# Patient Record
Sex: FEMALE | Race: WHITE | NOT HISPANIC OR LATINO | URBAN - METROPOLITAN AREA
[De-identification: names, ages, dates, MRNs, and addresses within clinical notes are randomized per-mention and may not be internally consistent; named-entity substitution may affect disease eponyms.]

---

## 2023-04-21 ENCOUNTER — OFFICE VISIT (OUTPATIENT)
Dept: PRIMARY CARE | Facility: CLINIC | Age: 23
End: 2023-04-21
Payer: COMMERCIAL

## 2023-04-21 ENCOUNTER — APPOINTMENT (OUTPATIENT)
Dept: LAB | Facility: LAB | Age: 23
End: 2023-04-21
Payer: COMMERCIAL

## 2023-04-21 VITALS
HEIGHT: 61 IN | BODY MASS INDEX: 44.56 KG/M2 | WEIGHT: 236 LBS | DIASTOLIC BLOOD PRESSURE: 68 MMHG | SYSTOLIC BLOOD PRESSURE: 112 MMHG

## 2023-04-21 DIAGNOSIS — G56.03 BILATERAL CARPAL TUNNEL SYNDROME: Primary | ICD-10-CM

## 2023-04-21 DIAGNOSIS — R59.1 LYMPHADENOPATHY: ICD-10-CM

## 2023-04-21 LAB — THYROTROPIN (MIU/L) IN SER/PLAS BY DETECTION LIMIT <= 0.05 MIU/L: 1.43 MIU/L (ref 0.44–3.98)

## 2023-04-21 PROCEDURE — 99213 OFFICE O/P EST LOW 20 MIN: CPT | Performed by: STUDENT IN AN ORGANIZED HEALTH CARE EDUCATION/TRAINING PROGRAM

## 2023-04-21 PROCEDURE — 36415 COLL VENOUS BLD VENIPUNCTURE: CPT

## 2023-04-21 PROCEDURE — 1036F TOBACCO NON-USER: CPT | Performed by: STUDENT IN AN ORGANIZED HEALTH CARE EDUCATION/TRAINING PROGRAM

## 2023-04-21 PROCEDURE — 84443 ASSAY THYROID STIM HORMONE: CPT

## 2023-04-21 RX ORDER — BETAMETHASONE DIPROPIONATE 0.5 MG/G
0.5 OINTMENT TOPICAL 2 TIMES DAILY
COMMUNITY
Start: 2022-05-20

## 2023-04-21 ASSESSMENT — ENCOUNTER SYMPTOMS
CARDIOVASCULAR NEGATIVE: 1
GASTROINTESTINAL NEGATIVE: 1
CONSTITUTIONAL NEGATIVE: 1
NEUROLOGICAL NEGATIVE: 1
PSYCHIATRIC NEGATIVE: 1
MUSCULOSKELETAL NEGATIVE: 1
RESPIRATORY NEGATIVE: 1

## 2023-04-21 NOTE — PROGRESS NOTES
"Subjective   Patient ID: Darshana Tapia is a 23 y.o. female who presents for Wrist Pain (Bilateral wrist discomfort, x 1 year, getting worse).    Patient seen on sick visit.  Regards that she has been having some numbness and tingling and some pain hands.  States that happens bilaterally, however left worse than right.  Gets worse as she works throughout the day.  Also has been having some swelling of her neck.  This comes and goes, feels like a lymph node is swollen.  Regards no additional issues or concerns at this time.         Review of Systems   Constitutional: Negative.    HENT: Negative.          Swelling of neck   Respiratory: Negative.     Cardiovascular: Negative.    Gastrointestinal: Negative.    Musculoskeletal: Negative.         Bilateral wrist pain   Neurological: Negative.    Psychiatric/Behavioral: Negative.         Objective   /68   Ht 1.549 m (5' 1\")   Wt 107 kg (236 lb)   BMI 44.59 kg/m²     Physical Exam  Constitutional:       General: She is not in acute distress.     Appearance: She is not ill-appearing.   HENT:      Head:        Comments: Slight lymphanopathy L sided under chin     Mouth/Throat:      Mouth: Mucous membranes are moist.      Pharynx: Oropharynx is clear. No oropharyngeal exudate or posterior oropharyngeal erythema.   Eyes:      Pupils: Pupils are equal, round, and reactive to light.   Cardiovascular:      Rate and Rhythm: Normal rate and regular rhythm.      Heart sounds: No murmur heard.  Pulmonary:      Effort: Pulmonary effort is normal. No respiratory distress.      Breath sounds: No wheezing.   Abdominal:      General: Abdomen is flat. Bowel sounds are normal. There is no distension.      Palpations: Abdomen is soft.      Tenderness: There is no abdominal tenderness.   Musculoskeletal:         General: No tenderness. Normal range of motion.      Comments: Phalen's pos bilaterall, dec sensation in first 3 fiungers   Skin:     General: Skin is warm and dry. "   Neurological:      General: No focal deficit present.      Mental Status: She is alert and oriented to person, place, and time. Mental status is at baseline.   Psychiatric:         Mood and Affect: Mood normal.         Assessment/Plan   Problem List Items Addressed This Visit          Nervous    Bilateral carpal tunnel syndrome - Primary    Relevant Orders    TSH with reflex to Free T4 if abnormal    EMG & nerve conduction       Immune    Lymphadenopathy    Relevant Orders    US head neck soft tissue        Patient seen on sick evaluation    Tunnel syndrome  Phalen's test positive bilaterally, recommend EMG, supportive braces, staying well-hydrated check TSH we will follow-up on EMG results could consider course of steroids as well as orthopedic referral if symptoms worsen      Lymphadenopathy  Noted on exam, slightly under left-sided chin, we will obtain ultrasound of the area, encouraged to monitor.  He will call if this worsens    Return to care and neck schedule appointment or as needed

## 2023-09-15 ENCOUNTER — OFFICE VISIT (OUTPATIENT)
Dept: PRIMARY CARE | Facility: CLINIC | Age: 23
End: 2023-09-15
Payer: COMMERCIAL

## 2023-09-15 VITALS
BODY MASS INDEX: 44.56 KG/M2 | DIASTOLIC BLOOD PRESSURE: 64 MMHG | SYSTOLIC BLOOD PRESSURE: 110 MMHG | WEIGHT: 236 LBS | HEIGHT: 61 IN

## 2023-09-15 DIAGNOSIS — J01.90 ACUTE RHINOSINUSITIS: Primary | ICD-10-CM

## 2023-09-15 PROBLEM — M25.373 UNSTABLE ANKLE: Status: RESOLVED | Noted: 2023-09-15 | Resolved: 2023-09-15

## 2023-09-15 PROBLEM — D18.01 HEMANGIOMA OF SKIN AND SUBCUTANEOUS TISSUE: Status: ACTIVE | Noted: 2023-09-15

## 2023-09-15 PROBLEM — H92.09 EAR PAIN: Status: RESOLVED | Noted: 2023-09-15 | Resolved: 2023-09-15

## 2023-09-15 PROBLEM — J40 BRONCHITIS: Status: RESOLVED | Noted: 2023-09-15 | Resolved: 2023-09-15

## 2023-09-15 PROBLEM — E28.39 SUPPRESSION OF OVULATION: Status: ACTIVE | Noted: 2023-09-15

## 2023-09-15 PROBLEM — M25.571 RIGHT ANKLE PAIN: Status: RESOLVED | Noted: 2023-09-15 | Resolved: 2023-09-15

## 2023-09-15 PROBLEM — E66.9 OBESITY: Status: RESOLVED | Noted: 2023-09-15 | Resolved: 2023-09-15

## 2023-09-15 PROBLEM — R21 RASH: Status: ACTIVE | Noted: 2023-09-15

## 2023-09-15 PROBLEM — D48.5 NEOPLASM OF UNCERTAIN BEHAVIOR OF SKIN: Status: ACTIVE | Noted: 2023-09-15

## 2023-09-15 PROBLEM — R10.9 ABDOMINAL PAIN: Status: RESOLVED | Noted: 2023-09-15 | Resolved: 2023-09-15

## 2023-09-15 PROBLEM — L30.9 ECZEMA: Status: ACTIVE | Noted: 2023-09-15

## 2023-09-15 PROBLEM — K59.00 CONSTIPATION: Status: ACTIVE | Noted: 2023-09-15

## 2023-09-15 PROCEDURE — 1036F TOBACCO NON-USER: CPT | Performed by: STUDENT IN AN ORGANIZED HEALTH CARE EDUCATION/TRAINING PROGRAM

## 2023-09-15 PROCEDURE — 99213 OFFICE O/P EST LOW 20 MIN: CPT | Performed by: STUDENT IN AN ORGANIZED HEALTH CARE EDUCATION/TRAINING PROGRAM

## 2023-09-15 RX ORDER — GUAIFENESIN, PSEUDOEPHEDRINE HYDROCHLORIDE 600; 60 MG/1; MG/1
1 TABLET, EXTENDED RELEASE ORAL EVERY 12 HOURS
Qty: 14 TABLET | Refills: 0 | Status: SHIPPED | OUTPATIENT
Start: 2023-09-15 | End: 2023-09-22

## 2023-09-15 RX ORDER — AMOXICILLIN AND CLAVULANATE POTASSIUM 875; 125 MG/1; MG/1
875 TABLET, FILM COATED ORAL 2 TIMES DAILY
Qty: 14 TABLET | Refills: 0 | Status: SHIPPED | OUTPATIENT
Start: 2023-09-15 | End: 2023-09-22

## 2023-09-15 RX ORDER — DROSPIRENONE 4 MG/1
1 TABLET, FILM COATED ORAL DAILY
COMMUNITY
Start: 2023-06-13

## 2023-09-15 RX ORDER — FLUTICASONE PROPIONATE 50 MCG
1 SPRAY, SUSPENSION (ML) NASAL DAILY
Qty: 16 G | Refills: 5 | Status: SHIPPED | OUTPATIENT
Start: 2023-09-15 | End: 2023-11-21 | Stop reason: WASHOUT

## 2023-09-15 NOTE — PROGRESS NOTES
"Subjective   Patient ID: Darshana Tapia is a 23 y.o. female who presents for Sinusitis (Head congestion/pressure, H/A,x  5 days).    HPI   Darshana is here for a sick visit.    She reports rhinorrhea, nasal congestion, pressure-like headache, post-nasal drainage, coughing up clear phlegm, facial pressure, bilateral ear pain and pressure. Endorses sore throat, chills, body aches which have resolved. Denies fevers, fatigue, wheezing, SOB. She presented to urgent care and was tested for COVID, flu, strep - all of which were negative. They did not give her medication at that time.     She was taking theraflu at home which was not helping her symptoms. She has taken ibuprofen for the headaches.     Review of Systems  A 10 point review of systems is otherwise negative unless stated in the HPI.    Objective   /64   Ht 1.549 m (5' 1\")   Wt 107 kg (236 lb)   BMI 44.59 kg/m²     Physical Exam  GEN: conversant, NAD  HEENT: PERRL, EOMI, right TM with fluid without erythema or signs of OM. Some tenderness to palpation L>R maxillary sinus   NECK: supple, full, no cervical lymphadenopathy palpated  CV: S1, S2, regular, no murmur  PULM: CTAB  ABD: soft, NT, obese  EXT: no LE edema  NEURO: no gross focal deficits  PSYCH: appropriate affect       Assessment/Plan   #acute rhinosinusitis, likely viral  #nasal congestion  - given script for flonase, mucinex D  - time course and constellation of symptoms consistent with acute viral rhinosinusitis. Patient with 5 days of symptoms. Antibiotic sent to pharmacy if patient develops fevers over the weekend or experiences worsening of symptoms. Discuss SE profile.    RTC as scheduled, sooner PRN    Med Khan MD       "

## 2023-11-21 ENCOUNTER — OFFICE VISIT (OUTPATIENT)
Dept: PRIMARY CARE | Facility: CLINIC | Age: 23
End: 2023-11-21
Payer: COMMERCIAL

## 2023-11-21 VITALS
WEIGHT: 242 LBS | SYSTOLIC BLOOD PRESSURE: 130 MMHG | BODY MASS INDEX: 45.69 KG/M2 | DIASTOLIC BLOOD PRESSURE: 72 MMHG | HEIGHT: 61 IN

## 2023-11-21 DIAGNOSIS — E66.01 MORBID OBESITY (MULTI): Primary | ICD-10-CM

## 2023-11-21 DIAGNOSIS — R59.1 LYMPHADENOPATHY: ICD-10-CM

## 2023-11-21 PROCEDURE — 96127 BRIEF EMOTIONAL/BEHAV ASSMT: CPT | Performed by: STUDENT IN AN ORGANIZED HEALTH CARE EDUCATION/TRAINING PROGRAM

## 2023-11-21 PROCEDURE — 99395 PREV VISIT EST AGE 18-39: CPT | Performed by: STUDENT IN AN ORGANIZED HEALTH CARE EDUCATION/TRAINING PROGRAM

## 2023-11-21 PROCEDURE — 1036F TOBACCO NON-USER: CPT | Performed by: STUDENT IN AN ORGANIZED HEALTH CARE EDUCATION/TRAINING PROGRAM

## 2023-11-21 ASSESSMENT — ENCOUNTER SYMPTOMS
NAUSEA: 1
ABDOMINAL DISTENTION: 1
DIARRHEA: 1
SEIZURES: 0
JOINT SWELLING: 0
PALPITATIONS: 0
CHILLS: 0
VOMITING: 0
COUGH: 0
UNEXPECTED WEIGHT CHANGE: 1
HEMATURIA: 0
SLEEP DISTURBANCE: 0
FEVER: 0
DYSURIA: 0
EYE PAIN: 0
SHORTNESS OF BREATH: 0
ABDOMINAL PAIN: 1

## 2023-11-21 NOTE — PROGRESS NOTES
"Subjective   Patient ID: Darshana Tapia is a 23 y.o. female who presents for Obesity (Discuss weight loss).    Patient presents for difficulty losing weight. She has gained over 100 lbs since 2020. Over the past 6 months, she has been eating well and exercising 3 days per week, in addition to an active job. This regimen has led to weight loss in the past, but has not been successful for her now. Her weight seems to fluctuate during the past 6 months. She reports using the treadmill for about 15-30 mins and weights. She eats chicken, rice, vegetables, oatmeal, eggs. She has not tried any other methods for weight. Patient also has chronic GI issues since she was a kid, including bloating, waking up with nausea, occasional abdominal pain and usually has diarrhea.      Review of Systems   Constitutional:  Positive for unexpected weight change. Negative for chills and fever.   HENT:  Negative for ear pain.    Eyes:  Negative for pain.   Respiratory:  Negative for cough and shortness of breath.    Cardiovascular:  Negative for chest pain and palpitations.   Gastrointestinal:  Positive for abdominal distention, abdominal pain, diarrhea and nausea. Negative for vomiting.   Genitourinary:  Negative for dysuria and hematuria.   Musculoskeletal:  Negative for joint swelling.   Skin:  Negative for rash.   Neurological:  Negative for seizures and syncope.   Psychiatric/Behavioral:  Negative for sleep disturbance.    All other systems reviewed and are negative.    Objective   /72   Ht 1.549 m (5' 1\")   Wt 110 kg (242 lb)   BMI 45.73 kg/m²     Physical Exam  Vitals and nursing note reviewed.   Constitutional:       General: She is not in acute distress.     Appearance: She is obese. She is not ill-appearing or toxic-appearing.   HENT:      Head: Normocephalic and atraumatic.      Right Ear: External ear normal.      Left Ear: External ear normal.      Mouth/Throat:      Mouth: Mucous membranes are moist.   Eyes:      " Conjunctiva/sclera: Conjunctivae normal.   Cardiovascular:      Rate and Rhythm: Normal rate and regular rhythm.      Heart sounds: Normal heart sounds.   Pulmonary:      Effort: Pulmonary effort is normal.      Breath sounds: Normal breath sounds.   Abdominal:      General: There is no distension.      Palpations: Abdomen is soft.      Tenderness: There is no abdominal tenderness. There is no guarding.   Musculoskeletal:      Cervical back: Neck supple.      Right lower leg: No edema.      Left lower leg: No edema.   Skin:     General: Skin is warm and dry.   Neurological:      General: No focal deficit present.      Mental Status: She is alert and oriented to person, place, and time.   Psychiatric:         Behavior: Behavior normal.         Assessment/Plan   Problem List Items Addressed This Visit             ICD-10-CM    Lymphadenopathy R59.1    Morbid obesity (CMS/[facility]) - Primary E66.01    Relevant Medications    naltrexone-bupropion (Contrave) 8-90 mg ER tablet    Other Relevant Orders    Referral to Nutrition Services     #Morbid obesity  -Gained ~100 lbs since 2020. Discussed medication options, potential nutritionist referral.   -Continue exercise and diet modifications   -discussed se profile of multiple medications, agreeable to contrave due to se profile  -will call if unable to get insurance coverage,>15 minutes spent in management of obesity    #Abdominal discomfort/bloating  #IBS, possible  -Celiac negative. Discussed FODMAPs diet.      #Health maintenance  -Declines COVID and flu vaccines     RTC in 3 months or prn

## 2023-11-21 NOTE — PROGRESS NOTES
"Subjective   Patient ID: Darshana Tapia is a 23 y.o. female who presents for Obesity (Discuss weight loss).    Patient presents for difficulty losing weight. She has gained over 100 lbs since 2020. Over the past 6 months, she has been eating well and exercising 3 days per week, in addition to an active job. This regimen has led to weight loss in the past, but has not been successful for her now. Her weight seems to fluctuate during the past 6 months. She reports using the treadmill for about 15-30 mins and weights. She eats chicken, rice, vegetables, oatmeal, eggs. She has not tried any other methods for weight. Patient also has chronic GI issues since she was a kid, including bloating, waking up with nausea, occasional abdominal pain and usually has diarrhea.     Review of Systems   Constitutional:  Positive for unexpected weight change. Negative for chills and fever.   HENT:  Negative for ear pain.    Eyes:  Negative for pain.   Respiratory:  Negative for cough and shortness of breath.    Cardiovascular:  Negative for chest pain and palpitations.   Gastrointestinal:  Positive for abdominal distention, abdominal pain, diarrhea and nausea. Negative for vomiting.   Genitourinary:  Negative for dysuria and hematuria.   Musculoskeletal:  Negative for joint swelling.   Skin:  Negative for rash.   Neurological:  Negative for seizures and syncope.   Psychiatric/Behavioral:  Negative for sleep disturbance.    All other systems reviewed and are negative.      Objective   /72   Ht 1.549 m (5' 1\")   Wt 110 kg (242 lb)   BMI 45.73 kg/m²     Physical Exam  Vitals and nursing note reviewed.   Constitutional:       General: She is not in acute distress.     Appearance: She is obese. She is not ill-appearing or toxic-appearing.   HENT:      Head: Normocephalic and atraumatic.      Right Ear: External ear normal.      Left Ear: External ear normal.      Mouth/Throat:      Mouth: Mucous membranes are moist.   Eyes:     "  Conjunctiva/sclera: Conjunctivae normal.   Cardiovascular:      Rate and Rhythm: Normal rate and regular rhythm.      Heart sounds: Normal heart sounds.   Pulmonary:      Effort: Pulmonary effort is normal.      Breath sounds: Normal breath sounds.   Abdominal:      General: There is no distension.      Palpations: Abdomen is soft.      Tenderness: There is no abdominal tenderness. There is no guarding.   Musculoskeletal:      Cervical back: Neck supple.      Right lower leg: No edema.      Left lower leg: No edema.   Skin:     General: Skin is warm and dry.   Neurological:      General: No focal deficit present.      Mental Status: She is alert and oriented to person, place, and time.   Psychiatric:         Behavior: Behavior normal.         Assessment/Plan   #Morbid obesity  -Gained ~100 lbs since 2020. Discussed medication options, potential nutritionist referral.   -Continue exercise and diet modifications    #Abdominal discomfort/bloating  #IBS, possible  -Celiac negative. Discussed FODMAPs diet.     #Health maintenance  -Declines COVID and flu vaccines    RTC in 1 year or earlier PRN

## 2024-10-17 ENCOUNTER — HOSPITAL ENCOUNTER (EMERGENCY)
Facility: HOSPITAL | Age: 24
Discharge: HOME | End: 2024-10-17
Attending: EMERGENCY MEDICINE

## 2024-10-17 ENCOUNTER — APPOINTMENT (OUTPATIENT)
Dept: CARDIOLOGY | Facility: HOSPITAL | Age: 24
End: 2024-10-17

## 2024-10-17 VITALS
BODY MASS INDEX: 46.82 KG/M2 | HEIGHT: 61 IN | HEART RATE: 98 BPM | RESPIRATION RATE: 18 BRPM | TEMPERATURE: 98.1 F | OXYGEN SATURATION: 98 % | SYSTOLIC BLOOD PRESSURE: 129 MMHG | DIASTOLIC BLOOD PRESSURE: 81 MMHG | WEIGHT: 248 LBS

## 2024-10-17 DIAGNOSIS — R19.7 NAUSEA, VOMITING AND DIARRHEA: Primary | ICD-10-CM

## 2024-10-17 DIAGNOSIS — R11.2 NAUSEA, VOMITING AND DIARRHEA: Primary | ICD-10-CM

## 2024-10-17 LAB
ALBUMIN SERPL BCP-MCNC: 4.6 G/DL (ref 3.4–5)
ALP SERPL-CCNC: 70 U/L (ref 33–110)
ALT SERPL W P-5'-P-CCNC: 16 U/L (ref 7–45)
ANION GAP SERPL CALC-SCNC: 15 MMOL/L (ref 10–20)
AST SERPL W P-5'-P-CCNC: 25 U/L (ref 9–39)
B-HCG SERPL-ACNC: <2 MIU/ML
BASOPHILS # BLD AUTO: 0.02 X10*3/UL (ref 0–0.1)
BASOPHILS NFR BLD AUTO: 0.3 %
BILIRUB SERPL-MCNC: 0.3 MG/DL (ref 0–1.2)
BUN SERPL-MCNC: 7 MG/DL (ref 6–23)
CALCIUM SERPL-MCNC: 9.4 MG/DL (ref 8.6–10.3)
CHLORIDE SERPL-SCNC: 107 MMOL/L (ref 98–107)
CO2 SERPL-SCNC: 22 MMOL/L (ref 21–32)
CREAT SERPL-MCNC: 0.68 MG/DL (ref 0.5–1.05)
EGFRCR SERPLBLD CKD-EPI 2021: >90 ML/MIN/1.73M*2
EOSINOPHIL # BLD AUTO: 0.01 X10*3/UL (ref 0–0.7)
EOSINOPHIL NFR BLD AUTO: 0.1 %
ERYTHROCYTE [DISTWIDTH] IN BLOOD BY AUTOMATED COUNT: 12.6 % (ref 11.5–14.5)
FLUAV RNA RESP QL NAA+PROBE: NOT DETECTED
FLUBV RNA RESP QL NAA+PROBE: NOT DETECTED
GLUCOSE SERPL-MCNC: 97 MG/DL (ref 74–99)
HCT VFR BLD AUTO: 42.7 % (ref 36–46)
HGB BLD-MCNC: 14.2 G/DL (ref 12–16)
IMM GRANULOCYTES # BLD AUTO: 0.02 X10*3/UL (ref 0–0.7)
IMM GRANULOCYTES NFR BLD AUTO: 0.3 % (ref 0–0.9)
LIPASE SERPL-CCNC: 15 U/L (ref 9–82)
LYMPHOCYTES # BLD AUTO: 1.2 X10*3/UL (ref 1.2–4.8)
LYMPHOCYTES NFR BLD AUTO: 15.7 %
MAGNESIUM SERPL-MCNC: 2.1 MG/DL (ref 1.6–2.4)
MCH RBC QN AUTO: 28.9 PG (ref 26–34)
MCHC RBC AUTO-ENTMCNC: 33.3 G/DL (ref 32–36)
MCV RBC AUTO: 87 FL (ref 80–100)
MONOCYTES # BLD AUTO: 0.37 X10*3/UL (ref 0.1–1)
MONOCYTES NFR BLD AUTO: 4.8 %
NEUTROPHILS # BLD AUTO: 6.01 X10*3/UL (ref 1.2–7.7)
NEUTROPHILS NFR BLD AUTO: 78.8 %
NRBC BLD-RTO: 0 /100 WBCS (ref 0–0)
PLATELET # BLD AUTO: 288 X10*3/UL (ref 150–450)
POTASSIUM SERPL-SCNC: 5 MMOL/L (ref 3.5–5.3)
PROT SERPL-MCNC: 7.9 G/DL (ref 6.4–8.2)
RBC # BLD AUTO: 4.92 X10*6/UL (ref 4–5.2)
SARS-COV-2 RNA RESP QL NAA+PROBE: NOT DETECTED
SODIUM SERPL-SCNC: 139 MMOL/L (ref 136–145)
WBC # BLD AUTO: 7.6 X10*3/UL (ref 4.4–11.3)

## 2024-10-17 PROCEDURE — 36415 COLL VENOUS BLD VENIPUNCTURE: CPT | Performed by: EMERGENCY MEDICINE

## 2024-10-17 PROCEDURE — 2500000005 HC RX 250 GENERAL PHARMACY W/O HCPCS: Performed by: EMERGENCY MEDICINE

## 2024-10-17 PROCEDURE — 84075 ASSAY ALKALINE PHOSPHATASE: CPT | Performed by: EMERGENCY MEDICINE

## 2024-10-17 PROCEDURE — 93005 ELECTROCARDIOGRAM TRACING: CPT

## 2024-10-17 PROCEDURE — 99283 EMERGENCY DEPT VISIT LOW MDM: CPT

## 2024-10-17 PROCEDURE — 85025 COMPLETE CBC W/AUTO DIFF WBC: CPT | Performed by: EMERGENCY MEDICINE

## 2024-10-17 PROCEDURE — 84702 CHORIONIC GONADOTROPIN TEST: CPT | Performed by: EMERGENCY MEDICINE

## 2024-10-17 PROCEDURE — 83735 ASSAY OF MAGNESIUM: CPT | Performed by: EMERGENCY MEDICINE

## 2024-10-17 PROCEDURE — 87636 SARSCOV2 & INF A&B AMP PRB: CPT | Performed by: EMERGENCY MEDICINE

## 2024-10-17 PROCEDURE — 83690 ASSAY OF LIPASE: CPT | Performed by: EMERGENCY MEDICINE

## 2024-10-17 RX ORDER — ONDANSETRON 4 MG/1
4 TABLET, ORALLY DISINTEGRATING ORAL EVERY 8 HOURS PRN
Qty: 5 TABLET | Refills: 0 | Status: SHIPPED | OUTPATIENT
Start: 2024-10-17

## 2024-10-17 RX ORDER — ONDANSETRON 4 MG/1
4 TABLET, ORALLY DISINTEGRATING ORAL ONCE
Status: COMPLETED | OUTPATIENT
Start: 2024-10-17 | End: 2024-10-17

## 2024-10-17 RX ADMIN — ONDANSETRON 4 MG: 4 TABLET, ORALLY DISINTEGRATING ORAL at 09:47

## 2024-10-17 ASSESSMENT — PAIN SCALES - GENERAL: PAINLEVEL_OUTOF10: 0 - NO PAIN

## 2024-10-17 ASSESSMENT — PAIN - FUNCTIONAL ASSESSMENT: PAIN_FUNCTIONAL_ASSESSMENT: 0-10

## 2024-10-17 ASSESSMENT — COLUMBIA-SUICIDE SEVERITY RATING SCALE - C-SSRS
6. HAVE YOU EVER DONE ANYTHING, STARTED TO DO ANYTHING, OR PREPARED TO DO ANYTHING TO END YOUR LIFE?: NO
1. IN THE PAST MONTH, HAVE YOU WISHED YOU WERE DEAD OR WISHED YOU COULD GO TO SLEEP AND NOT WAKE UP?: NO
2. HAVE YOU ACTUALLY HAD ANY THOUGHTS OF KILLING YOURSELF?: NO

## 2024-10-17 NOTE — Clinical Note
Darshana Tapia was seen and treated in our emergency department on 10/17/2024.  She may return to work on 10/18/2024.       If you have any questions or concerns, please don't hesitate to call.      Vish Cai MD

## 2024-10-17 NOTE — DISCHARGE INSTRUCTIONS
You were seen emergent today for evaluation of nausea vomiting diarrhea.  Your labs are reassuring.  Your exam is reassuring.  Please continue as needed Zofran for nausea and vomiting as prescribed.  Please return to the emergency room if you have worsening symptoms including but not limited to recurrent nausea vomiting, pain, distention, loss of bowel function, fever, or if you have any other concerns.  Please follow-up with your primary care provider regarding this emergency room visit.

## 2024-10-17 NOTE — Clinical Note
Darshana Tapia was seen and treated in our emergency department on 10/17/2024.  She may return to school on 10/18/2024.      If you have any questions or concerns, please don't hesitate to call.      Vish Cai MD

## 2024-10-17 NOTE — ED TRIAGE NOTES
Pt recently started a GLP-1 medication and is experiencing severe nausea and vomiting. Pt had taken prescribed zofran without relief,

## 2024-10-17 NOTE — ED PROVIDER NOTES
History/Exam limitations: none.   Additional history was obtained from patient and parent.          HPI:    Darshana Tapia is a 24 y.o. female PMH elevated BMI on GLP-1 inhibitor presenting for evaluation nausea vomiting diarrhea.  Patient states that she woke up early this morning began having vomiting nonbloody.  Has had multiple episodes.  Last episode over an hour ago.  Also had 3 episodes of nonbloody diarrhea.  States that she has had somewhat of an upset stomach but no pain currently.  No right upper quadrant pain.  No distention.  States that she ate [employer] last night and is concerned that may be related to her symptoms.  No headache vision changes, chest pain, shortness breath, fever, fatigue.  States that she had non-ODT oral Zofran at home and vomited the medication up when she attempted to take it.  No acute onset lower abdominal pain.  Denies chance of pregnancy.         Physical Exam:  ED Triage Vitals [10/17/24 0851]   Temperature Heart Rate Respirations BP   36.7 °C (98.1 °F) 97 18 125/81      Pulse Ox Temp Source Heart Rate Source Patient Position   98 % Oral -- --      BP Location FiO2 (%)     -- --        GEN:      Alert, NAD  Eyes:       PERRL, EOMI  HENT:      NC/AT, OP clear, airway patent, MM  CV:      RRR, no MRG, no LE pitting edema, 2+ radial and pedal pulses  PULM:     CTAB, no w/r/r, easy WOB, symmetric chest rise  ABD:      Soft, NT, ND, no masses, BS +  :       No CVA TTP  NEURO:   A&Ox3, no focal deficits    MSK:      FROM, no joint deformities or swelling, no e/o trauma  SKIN:       Warm and dry  PSYCH:    Appropriate mood and affect         MDM/ED Course:   Darshana Tapia is a 24 y.o. female PMH elevated BMI on GLP-1 inhibitor presenting for evaluation nausea vomiting diarrhea.  Vitals reassuring.  Exam as documented above.  Differential includes viral gastroenteritis, foodborne illness.  No recent travel antibiotic use or other red flag symptoms.  Significantly  invasive gastrointestinal bacterial illness.  Differential includes acute abdominal surgical pathology however less likely given lack of any tenderness or pain.  Differential includes bowel obstruction however less likely given no distention, no pain, bowel function present.  Considered side effect of GLP-1 inhibitor however reassuring the patient is having diarrhea as well.  Patient has Zofran at home however was not ODT, was unable to keep medication down.  Labs were drawn however IV reportedly blew and the patient requested deferring further IV attempt and ODT Zofran was given here in the ED.  Labs were obtained CBC reassuring.  Chemistry reassuring with reassuring liver enzymes.  Potassium mildly hemolyzed at 5.0 however creatinine reassuring at 0.68.  Lipase negative unlikely pancreatitis.  hCG negative.  COVID flu negative.  On reassessment patient tolerating p.o. and symptoms resolved.  EKG reassuring, low suspicion for atypical ACS given age and risk factors.  Patient was explained findings, exam/study results, the importance of follow up and the plan moving forward; patient verbalized understanding and agreement. All questions were answered and no new questions at this time. Patient will be discharged with strict return precautions, follow up plan with PCP.    EKG reviewed by me: 9:06 AM normal sinus rhythm heart rate 98, sinus arrhythmia, normal axis, normal intervals, poor baseline however no acute ischemic changes, no prior for comparison.     ED Course as of 10/18/24 1251   Thu Oct 17, 2024   1127 Patient feels improved, tolerating p.o.  No abdominal pain or distention.  Suspicion for urinary etiology, will defer urine his urine test not yet collected. [JM]      ED Course User Index  [JM] Vish Cai MD         Diagnoses as of 10/18/24 1251   Nausea, vomiting and diarrhea         Chronic medical conditions affecting care listed in MDM. I independently reviewed imaging studies and agreed with  radiology reads. I reviewed recent and relevant outside records including PCP notes, Prior discharge summaries, and prior radiology reports.    Procedure  Procedures    Diagnosis:   1.  Nausea, vomiting, and diarrhea    Dispo: Discharged in stable condition      Disclaimer: Portions of this note were dictated by speech recognition. An attempt at proof reading was made to minimize errors. Minor errors in transcription may be present.  Please call if questions.     Vish Cai MD  10/18/24 2492

## 2024-10-19 LAB
ATRIAL RATE: 98 BPM
P AXIS: 26 DEGREES
P OFFSET: 197 MS
P ONSET: 151 MS
PR INTERVAL: 132 MS
Q ONSET: 217 MS
QRS COUNT: 16 BEATS
QRS DURATION: 78 MS
QT INTERVAL: 370 MS
QTC CALCULATION(BAZETT): 472 MS
QTC FREDERICIA: 435 MS
R AXIS: 26 DEGREES
T AXIS: 24 DEGREES
T OFFSET: 402 MS
VENTRICULAR RATE: 98 BPM

## 2025-01-03 ENCOUNTER — HOSPITAL ENCOUNTER (EMERGENCY)
Facility: HOSPITAL | Age: 25
Discharge: HOME | End: 2025-01-03
Attending: EMERGENCY MEDICINE

## 2025-01-03 ENCOUNTER — APPOINTMENT (OUTPATIENT)
Dept: RADIOLOGY | Facility: HOSPITAL | Age: 25
End: 2025-01-03

## 2025-01-03 VITALS
WEIGHT: 220 LBS | RESPIRATION RATE: 18 BRPM | HEIGHT: 61 IN | OXYGEN SATURATION: 100 % | DIASTOLIC BLOOD PRESSURE: 81 MMHG | SYSTOLIC BLOOD PRESSURE: 118 MMHG | BODY MASS INDEX: 41.54 KG/M2 | HEART RATE: 74 BPM | TEMPERATURE: 97.2 F

## 2025-01-03 DIAGNOSIS — K80.20 CALCULUS OF GALLBLADDER WITHOUT CHOLECYSTITIS WITHOUT OBSTRUCTION: Primary | ICD-10-CM

## 2025-01-03 LAB
ALBUMIN SERPL BCP-MCNC: 3.9 G/DL (ref 3.4–5)
ALP SERPL-CCNC: 56 U/L (ref 33–110)
ALT SERPL W P-5'-P-CCNC: 15 U/L (ref 7–45)
ANION GAP SERPL CALC-SCNC: 12 MMOL/L (ref 10–20)
APPEARANCE UR: CLEAR
AST SERPL W P-5'-P-CCNC: 32 U/L (ref 9–39)
BACTERIA #/AREA URNS AUTO: ABNORMAL /HPF
BASOPHILS # BLD AUTO: 0.02 X10*3/UL (ref 0–0.1)
BASOPHILS NFR BLD AUTO: 0.3 %
BILIRUB SERPL-MCNC: 0.4 MG/DL (ref 0–1.2)
BILIRUB UR STRIP.AUTO-MCNC: NEGATIVE MG/DL
BUN SERPL-MCNC: 10 MG/DL (ref 6–23)
CALCIUM SERPL-MCNC: 9 MG/DL (ref 8.6–10.3)
CHLORIDE SERPL-SCNC: 106 MMOL/L (ref 98–107)
CO2 SERPL-SCNC: 23 MMOL/L (ref 21–32)
COLOR UR: COLORLESS
CREAT SERPL-MCNC: 0.69 MG/DL (ref 0.5–1.05)
EGFRCR SERPLBLD CKD-EPI 2021: >90 ML/MIN/1.73M*2
EOSINOPHIL # BLD AUTO: 0.1 X10*3/UL (ref 0–0.7)
EOSINOPHIL NFR BLD AUTO: 1.4 %
ERYTHROCYTE [DISTWIDTH] IN BLOOD BY AUTOMATED COUNT: 13.2 % (ref 11.5–14.5)
GLUCOSE SERPL-MCNC: 92 MG/DL (ref 74–99)
GLUCOSE UR STRIP.AUTO-MCNC: NORMAL MG/DL
HCG UR QL IA.RAPID: NEGATIVE
HCT VFR BLD AUTO: 38.6 % (ref 36–46)
HGB BLD-MCNC: 12.9 G/DL (ref 12–16)
IMM GRANULOCYTES # BLD AUTO: 0.02 X10*3/UL (ref 0–0.7)
IMM GRANULOCYTES NFR BLD AUTO: 0.3 % (ref 0–0.9)
KETONES UR STRIP.AUTO-MCNC: NEGATIVE MG/DL
LEUKOCYTE ESTERASE UR QL STRIP.AUTO: ABNORMAL
LIPASE SERPL-CCNC: 26 U/L (ref 9–82)
LYMPHOCYTES # BLD AUTO: 3.35 X10*3/UL (ref 1.2–4.8)
LYMPHOCYTES NFR BLD AUTO: 46.6 %
MCH RBC QN AUTO: 29.1 PG (ref 26–34)
MCHC RBC AUTO-ENTMCNC: 33.4 G/DL (ref 32–36)
MCV RBC AUTO: 87 FL (ref 80–100)
MONOCYTES # BLD AUTO: 0.63 X10*3/UL (ref 0.1–1)
MONOCYTES NFR BLD AUTO: 8.8 %
NEUTROPHILS # BLD AUTO: 3.07 X10*3/UL (ref 1.2–7.7)
NEUTROPHILS NFR BLD AUTO: 42.6 %
NITRITE UR QL STRIP.AUTO: NEGATIVE
NRBC BLD-RTO: 0 /100 WBCS (ref 0–0)
PH UR STRIP.AUTO: 6 [PH]
PLATELET # BLD AUTO: 208 X10*3/UL (ref 150–450)
POTASSIUM SERPL-SCNC: 4.7 MMOL/L (ref 3.5–5.3)
PROT SERPL-MCNC: 7.3 G/DL (ref 6.4–8.2)
PROT UR STRIP.AUTO-MCNC: NEGATIVE MG/DL
RBC # BLD AUTO: 4.43 X10*6/UL (ref 4–5.2)
RBC # UR STRIP.AUTO: ABNORMAL /UL
RBC #/AREA URNS AUTO: ABNORMAL /HPF
SODIUM SERPL-SCNC: 136 MMOL/L (ref 136–145)
SP GR UR STRIP.AUTO: 1.01
SQUAMOUS #/AREA URNS AUTO: ABNORMAL /HPF
UROBILINOGEN UR STRIP.AUTO-MCNC: NORMAL MG/DL
WBC # BLD AUTO: 7.2 X10*3/UL (ref 4.4–11.3)
WBC #/AREA URNS AUTO: ABNORMAL /HPF

## 2025-01-03 PROCEDURE — 84075 ASSAY ALKALINE PHOSPHATASE: CPT | Performed by: INTERNAL MEDICINE

## 2025-01-03 PROCEDURE — 36415 COLL VENOUS BLD VENIPUNCTURE: CPT | Performed by: INTERNAL MEDICINE

## 2025-01-03 PROCEDURE — 76705 ECHO EXAM OF ABDOMEN: CPT | Mod: FOREIGN READ | Performed by: RADIOLOGY

## 2025-01-03 PROCEDURE — 83690 ASSAY OF LIPASE: CPT | Performed by: EMERGENCY MEDICINE

## 2025-01-03 PROCEDURE — 85025 COMPLETE CBC W/AUTO DIFF WBC: CPT | Performed by: EMERGENCY MEDICINE

## 2025-01-03 PROCEDURE — 81025 URINE PREGNANCY TEST: CPT | Performed by: EMERGENCY MEDICINE

## 2025-01-03 PROCEDURE — 87086 URINE CULTURE/COLONY COUNT: CPT | Mod: AHULAB | Performed by: EMERGENCY MEDICINE

## 2025-01-03 PROCEDURE — 81001 URINALYSIS AUTO W/SCOPE: CPT | Performed by: EMERGENCY MEDICINE

## 2025-01-03 PROCEDURE — 83690 ASSAY OF LIPASE: CPT | Performed by: INTERNAL MEDICINE

## 2025-01-03 PROCEDURE — 85025 COMPLETE CBC W/AUTO DIFF WBC: CPT | Performed by: INTERNAL MEDICINE

## 2025-01-03 PROCEDURE — 81025 URINE PREGNANCY TEST: CPT | Performed by: INTERNAL MEDICINE

## 2025-01-03 PROCEDURE — 80053 COMPREHEN METABOLIC PANEL: CPT | Performed by: EMERGENCY MEDICINE

## 2025-01-03 PROCEDURE — 81001 URINALYSIS AUTO W/SCOPE: CPT | Performed by: INTERNAL MEDICINE

## 2025-01-03 PROCEDURE — 76705 ECHO EXAM OF ABDOMEN: CPT

## 2025-01-03 PROCEDURE — 99284 EMERGENCY DEPT VISIT MOD MDM: CPT | Mod: 25 | Performed by: EMERGENCY MEDICINE

## 2025-01-03 RX ORDER — KETOROLAC TROMETHAMINE 30 MG/ML
30 INJECTION, SOLUTION INTRAMUSCULAR; INTRAVENOUS ONCE
Status: DISCONTINUED | OUTPATIENT
Start: 2025-01-03 | End: 2025-01-03 | Stop reason: HOSPADM

## 2025-01-03 ASSESSMENT — PAIN DESCRIPTION - LOCATION: LOCATION: ABDOMEN

## 2025-01-03 ASSESSMENT — PAIN - FUNCTIONAL ASSESSMENT: PAIN_FUNCTIONAL_ASSESSMENT: 0-10

## 2025-01-03 ASSESSMENT — PAIN DESCRIPTION - ORIENTATION: ORIENTATION: RIGHT

## 2025-01-03 ASSESSMENT — PAIN DESCRIPTION - DESCRIPTORS: DESCRIPTORS: ACHING

## 2025-01-03 ASSESSMENT — PAIN DESCRIPTION - PAIN TYPE: TYPE: ACUTE PAIN

## 2025-01-03 ASSESSMENT — PAIN SCALES - GENERAL
PAINLEVEL_OUTOF10: 7
PAINLEVEL_OUTOF10: 4

## 2025-01-03 ASSESSMENT — COLUMBIA-SUICIDE SEVERITY RATING SCALE - C-SSRS
1. IN THE PAST MONTH, HAVE YOU WISHED YOU WERE DEAD OR WISHED YOU COULD GO TO SLEEP AND NOT WAKE UP?: NO
2. HAVE YOU ACTUALLY HAD ANY THOUGHTS OF KILLING YOURSELF?: NO
6. HAVE YOU EVER DONE ANYTHING, STARTED TO DO ANYTHING, OR PREPARED TO DO ANYTHING TO END YOUR LIFE?: NO

## 2025-01-03 NOTE — ED PROVIDER NOTES
HPI   Chief Complaint   Patient presents with   • Abdominal Pain       HPI: []  24-year-old white female history of high BMI comes in with abdominal pain for the last few days.  Pain localized at the right upper quadrant had some nausea but no vomiting or has chronic diarrhea no fever no chills no flank pain no urine frequency urgency hematuria no hematemesis melena hematochezia no hemoptysis no recent travel hospitalization or antibiotic use.    Past history: None  Social: Pat denies current tobacco alcohol drug abuse.  REVIEW OF SYSTEMS:    GENERAL.: No weight loss, fatigue, anorexia, insomnia, fever.    EYES: No vision loss, double vision, drainage, eye pain.    ENT: No pharyngitis, dry mouth.    CARDIOPULMONARY: No chest pain, palpitations, syncope, near syncope. No shortness of breath, cough, hemoptysis.    GI: Positive for abdominal pain, change in bowel habits, melena, hematemesis, hematochezia, positive nausea, vomiting, diarrhea.    : No discharge, dysuria, frequency, urgency, hematuria.    MS: No limb pain, joint pain, joint swelling.    SKIN: No rashes.    PSYCH: No depression, anxiety, suicidality, homicidality.    Review of systems is otherwise negative unless stated above or in history of present illness.  Social history, family history, allergies reviewed.  PHYSICAL EXAM:    GENERAL: Vitals noted, no distress. Alert and oriented  x 3. Non-toxic.  High BMI    EENT: TMs clear. Posterior oropharynx unremarkable. No meningismus. No LAD.     NECK: Supple. Nontender. No midline tenderness.     CARDIAC: Regular, rate, rhythm. No murmurs rubs or gallops. No JVD    PULMONARY: Lungs clear bilaterally with good aeration. No wheezes rales or rhonchi. No respiratory distress.     ABDOMEN: Soft, nonsurgical.  Tender right upper quadrant positive Quintana sign no rebound or guarding negative CVA tenderness negative inguinal hernias no peritoneal signs. Normoactive bowel sounds. No pulsatile masses.  Benign  nonsurgical abdomen    EXTREMITIES: No peripheral edema. Negative Homans bilaterally, no cords.  2+ bounding pulses well-perfused.    SKIN: No rash. Intact.     NEURO: No focal neurologic deficits, NIH score of 0. Cranial nerves normal as tested from II through XII.     MEDICAL DECISION MAKING:  CBC with differential shows no leukocytosis stable hemoglobin chemistries LFTs normal lipase normal UA shows no UTI ultrasound of the gallbladder confirms cholelithiasis no cholecystitis.    Treatment in ED: IV established given IV Toradol.    Course: Patient faraz asymptomatic pain well-controlled.    Impression: #1 acute cholelithiasis    Plan set MDM: 32-year-old female comes in with upper abdominal pain workup is consistent with acute cholelithiasis with no cholecystitis currently she has no fever no leukocytosis normal LFTs are normal lipase low concern for cholecystitis pancreatitis or choledocholithiasis, patient be discharged home with supportive care bland diet outpatient follow-up with primary doctor and general surgery with strict return precaution.              Patient History   Past Medical History:   Diagnosis Date   • Bronchitis 09/15/2023   • Ear pain 09/15/2023   • Obesity 09/15/2023   • Personal history of other specified conditions 09/14/2021    History of abdominal pain   • Right ankle pain 09/15/2023   • Unstable ankle 09/15/2023     Past Surgical History:   Procedure Laterality Date   • OTHER SURGICAL HISTORY  03/13/2020    Finger surgical procedure     No family history on file.  Social History     Tobacco Use   • Smoking status: Never   • Smokeless tobacco: Never   Vaping Use   • Vaping status: Never Used   Substance Use Topics   • Alcohol use: Never   • Drug use: Never       Physical Exam   ED Triage Vitals [01/03/25 0312]   Temperature Heart Rate Respirations BP   36.2 °C (97.2 °F) 77 18 122/73      Pulse Ox Temp Source Heart Rate Source Patient Position   99 % Temporal Monitor Sitting      BP  Location FiO2 (%)     Left arm --       Physical Exam      ED Course & MDM   ED Course as of 01/03/25 0821   Fri Jan 03, 2025   0817 CBC with differential chemistries LFTs are normal lipase normal abdominal ultrasound confirms cholelithiasis no cholecystitis, patient received IV Toradol will be discharged home with supportive care outpatient follow with primary doctor and general surgery for further evaluation with strict return precaution. [MT]      ED Course User Index  [MT] Brittney Macias MD         Diagnoses as of 01/03/25 0821   Calculus of gallbladder without cholecystitis without obstruction                 No data recorded     Chana Coma Scale Score: 15 (01/03/25 0315 : Gabrielle Spatz, RN)                           Medical Decision Making      Procedure  Procedures     Brittney Macias MD  01/03/25 0821

## 2025-01-03 NOTE — ED TRIAGE NOTES
Pt to ED today with a chief complaint of RUQ abdominal pain that has been on and off since christmas eve. Pt denies pain radiating into her back or anywhere else in her abdomen. Pt states that she has had diarrhea and nausea on and off but denies any episodes of vomiting. Pt denies any hx of abdominal surgeries. Pt states that she is currently menstruating.

## 2025-01-04 LAB — BACTERIA UR CULT: NORMAL

## 2025-01-05 ENCOUNTER — APPOINTMENT (OUTPATIENT)
Dept: RADIOLOGY | Facility: HOSPITAL | Age: 25
End: 2025-01-05

## 2025-01-05 ENCOUNTER — HOSPITAL ENCOUNTER (OUTPATIENT)
Facility: HOSPITAL | Age: 25
Setting detail: OBSERVATION
Discharge: HOME | End: 2025-01-06
Attending: GENERAL PRACTICE | Admitting: INTERNAL MEDICINE

## 2025-01-05 DIAGNOSIS — K81.9 CHOLECYSTITIS: ICD-10-CM

## 2025-01-05 DIAGNOSIS — K80.20 CALCULUS OF GALLBLADDER WITHOUT CHOLECYSTITIS WITHOUT OBSTRUCTION: Primary | ICD-10-CM

## 2025-01-05 LAB
BASOPHILS # BLD AUTO: 0.05 X10*3/UL (ref 0–0.1)
BASOPHILS NFR BLD AUTO: 0.5 %
EOSINOPHIL # BLD AUTO: 0.06 X10*3/UL (ref 0–0.7)
EOSINOPHIL NFR BLD AUTO: 0.6 %
ERYTHROCYTE [DISTWIDTH] IN BLOOD BY AUTOMATED COUNT: 13 % (ref 11.5–14.5)
HCT VFR BLD AUTO: 40.7 % (ref 36–46)
HGB BLD-MCNC: 13.4 G/DL (ref 12–16)
IMM GRANULOCYTES # BLD AUTO: 0.04 X10*3/UL (ref 0–0.7)
IMM GRANULOCYTES NFR BLD AUTO: 0.4 % (ref 0–0.9)
LYMPHOCYTES # BLD AUTO: 2.63 X10*3/UL (ref 1.2–4.8)
LYMPHOCYTES NFR BLD AUTO: 24.4 %
MCH RBC QN AUTO: 29.3 PG (ref 26–34)
MCHC RBC AUTO-ENTMCNC: 32.9 G/DL (ref 32–36)
MCV RBC AUTO: 89 FL (ref 80–100)
MONOCYTES # BLD AUTO: 0.82 X10*3/UL (ref 0.1–1)
MONOCYTES NFR BLD AUTO: 7.6 %
NEUTROPHILS # BLD AUTO: 7.18 X10*3/UL (ref 1.2–7.7)
NEUTROPHILS NFR BLD AUTO: 66.5 %
NRBC BLD-RTO: 0 /100 WBCS (ref 0–0)
PLATELET # BLD AUTO: 230 X10*3/UL (ref 150–450)
RBC # BLD AUTO: 4.57 X10*6/UL (ref 4–5.2)
WBC # BLD AUTO: 10.8 X10*3/UL (ref 4.4–11.3)

## 2025-01-05 PROCEDURE — 99285 EMERGENCY DEPT VISIT HI MDM: CPT | Mod: 25 | Performed by: GENERAL PRACTICE

## 2025-01-05 PROCEDURE — 76705 ECHO EXAM OF ABDOMEN: CPT

## 2025-01-05 PROCEDURE — 85025 COMPLETE CBC W/AUTO DIFF WBC: CPT | Performed by: GENERAL PRACTICE

## 2025-01-05 PROCEDURE — 76705 ECHO EXAM OF ABDOMEN: CPT | Performed by: RADIOLOGY

## 2025-01-05 PROCEDURE — 2500000004 HC RX 250 GENERAL PHARMACY W/ HCPCS (ALT 636 FOR OP/ED): Performed by: GENERAL PRACTICE

## 2025-01-05 PROCEDURE — 36415 COLL VENOUS BLD VENIPUNCTURE: CPT | Performed by: GENERAL PRACTICE

## 2025-01-05 PROCEDURE — 96374 THER/PROPH/DIAG INJ IV PUSH: CPT | Mod: 59

## 2025-01-05 RX ORDER — ONDANSETRON HYDROCHLORIDE 2 MG/ML
4 INJECTION, SOLUTION INTRAVENOUS ONCE
Status: COMPLETED | OUTPATIENT
Start: 2025-01-05 | End: 2025-01-05

## 2025-01-05 RX ORDER — MORPHINE SULFATE 4 MG/ML
4 INJECTION, SOLUTION INTRAMUSCULAR; INTRAVENOUS ONCE
Status: COMPLETED | OUTPATIENT
Start: 2025-01-05 | End: 2025-01-05

## 2025-01-05 RX ADMIN — ONDANSETRON 4 MG: 2 INJECTION, SOLUTION INTRAMUSCULAR; INTRAVENOUS at 23:54

## 2025-01-05 RX ADMIN — MORPHINE SULFATE 4 MG: 4 INJECTION, SOLUTION INTRAMUSCULAR; INTRAVENOUS at 23:56

## 2025-01-05 ASSESSMENT — PAIN - FUNCTIONAL ASSESSMENT: PAIN_FUNCTIONAL_ASSESSMENT: 0-10

## 2025-01-05 ASSESSMENT — PAIN SCALES - GENERAL
PAINLEVEL_OUTOF10: 0 - NO PAIN
PAINLEVEL_OUTOF10: 9
PAINLEVEL_OUTOF10: 9
PAINLEVEL_OUTOF10: 10 - WORST POSSIBLE PAIN

## 2025-01-06 ENCOUNTER — ANESTHESIA EVENT (OUTPATIENT)
Dept: OPERATING ROOM | Facility: HOSPITAL | Age: 25
End: 2025-01-06

## 2025-01-06 ENCOUNTER — ANESTHESIA (OUTPATIENT)
Dept: OPERATING ROOM | Facility: HOSPITAL | Age: 25
End: 2025-01-06

## 2025-01-06 ENCOUNTER — APPOINTMENT (OUTPATIENT)
Dept: RADIOLOGY | Facility: HOSPITAL | Age: 25
End: 2025-01-06

## 2025-01-06 VITALS
RESPIRATION RATE: 17 BRPM | HEIGHT: 61 IN | OXYGEN SATURATION: 98 % | SYSTOLIC BLOOD PRESSURE: 126 MMHG | DIASTOLIC BLOOD PRESSURE: 73 MMHG | WEIGHT: 220 LBS | HEART RATE: 102 BPM | TEMPERATURE: 98 F | BODY MASS INDEX: 41.54 KG/M2

## 2025-01-06 PROBLEM — K80.20 CALCULUS OF GALLBLADDER WITHOUT CHOLECYSTITIS WITHOUT OBSTRUCTION: Status: ACTIVE | Noted: 2025-01-06

## 2025-01-06 LAB
ALBUMIN SERPL BCP-MCNC: 3.7 G/DL (ref 3.4–5)
ALBUMIN SERPL BCP-MCNC: 3.8 G/DL (ref 3.4–5)
ALP SERPL-CCNC: 65 U/L (ref 33–110)
ALP SERPL-CCNC: 70 U/L (ref 33–110)
ALT SERPL W P-5'-P-CCNC: 336 U/L (ref 7–45)
ALT SERPL W P-5'-P-CCNC: 80 U/L (ref 7–45)
ANION GAP SERPL CALC-SCNC: 11 MMOL/L (ref 10–20)
ANION GAP SERPL CALC-SCNC: 9 MMOL/L (ref 10–20)
APPEARANCE UR: CLEAR
AST SERPL W P-5'-P-CCNC: 169 U/L (ref 9–39)
AST SERPL W P-5'-P-CCNC: 691 U/L (ref 9–39)
B-HCG SERPL-ACNC: <2 MIU/ML
BILIRUB SERPL-MCNC: 0.7 MG/DL (ref 0–1.2)
BILIRUB SERPL-MCNC: 1 MG/DL (ref 0–1.2)
BILIRUB UR STRIP.AUTO-MCNC: NEGATIVE MG/DL
BUN SERPL-MCNC: 11 MG/DL (ref 6–23)
BUN SERPL-MCNC: 13 MG/DL (ref 6–23)
CALCIUM SERPL-MCNC: 8.8 MG/DL (ref 8.6–10.3)
CALCIUM SERPL-MCNC: 9.1 MG/DL (ref 8.6–10.3)
CHLORIDE SERPL-SCNC: 106 MMOL/L (ref 98–107)
CHLORIDE SERPL-SCNC: 108 MMOL/L (ref 98–107)
CO2 SERPL-SCNC: 25 MMOL/L (ref 21–32)
CO2 SERPL-SCNC: 26 MMOL/L (ref 21–32)
COLOR UR: NORMAL
CREAT SERPL-MCNC: 0.66 MG/DL (ref 0.5–1.05)
CREAT SERPL-MCNC: 0.67 MG/DL (ref 0.5–1.05)
EGFRCR SERPLBLD CKD-EPI 2021: >90 ML/MIN/1.73M*2
EGFRCR SERPLBLD CKD-EPI 2021: >90 ML/MIN/1.73M*2
ERYTHROCYTE [DISTWIDTH] IN BLOOD BY AUTOMATED COUNT: 13.1 % (ref 11.5–14.5)
GLUCOSE SERPL-MCNC: 112 MG/DL (ref 74–99)
GLUCOSE SERPL-MCNC: 97 MG/DL (ref 74–99)
GLUCOSE UR STRIP.AUTO-MCNC: NORMAL MG/DL
HCT VFR BLD AUTO: 36.1 % (ref 36–46)
HGB BLD-MCNC: 12.3 G/DL (ref 12–16)
KETONES UR STRIP.AUTO-MCNC: NEGATIVE MG/DL
LEUKOCYTE ESTERASE UR QL STRIP.AUTO: NEGATIVE
LIPASE SERPL-CCNC: 18 U/L (ref 9–82)
MCH RBC QN AUTO: 29.3 PG (ref 26–34)
MCHC RBC AUTO-ENTMCNC: 34.1 G/DL (ref 32–36)
MCV RBC AUTO: 86 FL (ref 80–100)
NITRITE UR QL STRIP.AUTO: NEGATIVE
NRBC BLD-RTO: 0 /100 WBCS (ref 0–0)
PH UR STRIP.AUTO: 6 [PH]
PLATELET # BLD AUTO: 206 X10*3/UL (ref 150–450)
POTASSIUM SERPL-SCNC: 3.8 MMOL/L (ref 3.5–5.3)
POTASSIUM SERPL-SCNC: 3.8 MMOL/L (ref 3.5–5.3)
PROT SERPL-MCNC: 6.8 G/DL (ref 6.4–8.2)
PROT SERPL-MCNC: 7.2 G/DL (ref 6.4–8.2)
PROT UR STRIP.AUTO-MCNC: NEGATIVE MG/DL
RBC # BLD AUTO: 4.2 X10*6/UL (ref 4–5.2)
RBC # UR STRIP.AUTO: NEGATIVE /UL
SODIUM SERPL-SCNC: 138 MMOL/L (ref 136–145)
SODIUM SERPL-SCNC: 139 MMOL/L (ref 136–145)
SP GR UR STRIP.AUTO: 1.02
UROBILINOGEN UR STRIP.AUTO-MCNC: NORMAL MG/DL
WBC # BLD AUTO: 3.8 X10*3/UL (ref 4.4–11.3)

## 2025-01-06 PROCEDURE — G0378 HOSPITAL OBSERVATION PER HR: HCPCS

## 2025-01-06 PROCEDURE — 36415 COLL VENOUS BLD VENIPUNCTURE: CPT | Performed by: GENERAL PRACTICE

## 2025-01-06 PROCEDURE — 36415 COLL VENOUS BLD VENIPUNCTURE: CPT | Performed by: NURSE PRACTITIONER

## 2025-01-06 PROCEDURE — 2500000004 HC RX 250 GENERAL PHARMACY W/ HCPCS (ALT 636 FOR OP/ED): Performed by: INTERNAL MEDICINE

## 2025-01-06 PROCEDURE — 3600000003 HC OR TIME - INITIAL BASE CHARGE - PROCEDURE LEVEL THREE: Performed by: SURGERY

## 2025-01-06 PROCEDURE — 99232 SBSQ HOSP IP/OBS MODERATE 35: CPT

## 2025-01-06 PROCEDURE — 7100000001 HC RECOVERY ROOM TIME - INITIAL BASE CHARGE: Performed by: SURGERY

## 2025-01-06 PROCEDURE — 80053 COMPREHEN METABOLIC PANEL: CPT | Performed by: GENERAL PRACTICE

## 2025-01-06 PROCEDURE — 2500000001 HC RX 250 WO HCPCS SELF ADMINISTERED DRUGS (ALT 637 FOR MEDICARE OP): Performed by: SURGERY

## 2025-01-06 PROCEDURE — 88304 TISSUE EXAM BY PATHOLOGIST: CPT | Mod: TC,AHULAB | Performed by: SURGERY

## 2025-01-06 PROCEDURE — 7100000002 HC RECOVERY ROOM TIME - EACH INCREMENTAL 1 MINUTE: Performed by: SURGERY

## 2025-01-06 PROCEDURE — 99222 1ST HOSP IP/OBS MODERATE 55: CPT | Performed by: NURSE PRACTITIONER

## 2025-01-06 PROCEDURE — 99239 HOSP IP/OBS DSCHRG MGMT >30: CPT

## 2025-01-06 PROCEDURE — 81003 URINALYSIS AUTO W/O SCOPE: CPT | Performed by: GENERAL PRACTICE

## 2025-01-06 PROCEDURE — 96376 TX/PRO/DX INJ SAME DRUG ADON: CPT | Mod: 59

## 2025-01-06 PROCEDURE — 74300 X-RAY BILE DUCTS/PANCREAS: CPT

## 2025-01-06 PROCEDURE — 80053 COMPREHEN METABOLIC PANEL: CPT | Performed by: NURSE PRACTITIONER

## 2025-01-06 PROCEDURE — 96375 TX/PRO/DX INJ NEW DRUG ADDON: CPT | Mod: 59

## 2025-01-06 PROCEDURE — A47563 PR LAP,CHOLECYSTECTOMY/GRAPH: Performed by: ANESTHESIOLOGY

## 2025-01-06 PROCEDURE — 2550000001 HC RX 255 CONTRASTS: Performed by: SURGERY

## 2025-01-06 PROCEDURE — 2500000004 HC RX 250 GENERAL PHARMACY W/ HCPCS (ALT 636 FOR OP/ED): Performed by: SURGERY

## 2025-01-06 PROCEDURE — 47563 LAPARO CHOLECYSTECTOMY/GRAPH: CPT | Performed by: SURGERY

## 2025-01-06 PROCEDURE — 2500000004 HC RX 250 GENERAL PHARMACY W/ HCPCS (ALT 636 FOR OP/ED)

## 2025-01-06 PROCEDURE — 2500000004 HC RX 250 GENERAL PHARMACY W/ HCPCS (ALT 636 FOR OP/ED): Performed by: NURSE PRACTITIONER

## 2025-01-06 PROCEDURE — 3700000001 HC GENERAL ANESTHESIA TIME - INITIAL BASE CHARGE: Performed by: SURGERY

## 2025-01-06 PROCEDURE — 83690 ASSAY OF LIPASE: CPT | Performed by: GENERAL PRACTICE

## 2025-01-06 PROCEDURE — 2720000007 HC OR 272 NO HCPCS: Performed by: SURGERY

## 2025-01-06 PROCEDURE — 3700000002 HC GENERAL ANESTHESIA TIME - EACH INCREMENTAL 1 MINUTE: Performed by: SURGERY

## 2025-01-06 PROCEDURE — 84702 CHORIONIC GONADOTROPIN TEST: CPT | Performed by: GENERAL PRACTICE

## 2025-01-06 PROCEDURE — 99221 1ST HOSP IP/OBS SF/LOW 40: CPT | Performed by: SURGERY

## 2025-01-06 PROCEDURE — A47563 PR LAP,CHOLECYSTECTOMY/GRAPH: Performed by: ANESTHESIOLOGIST ASSISTANT

## 2025-01-06 PROCEDURE — 85027 COMPLETE CBC AUTOMATED: CPT | Performed by: NURSE PRACTITIONER

## 2025-01-06 PROCEDURE — 3600000008 HC OR TIME - EACH INCREMENTAL 1 MINUTE - PROCEDURE LEVEL THREE: Performed by: SURGERY

## 2025-01-06 PROCEDURE — 2500000004 HC RX 250 GENERAL PHARMACY W/ HCPCS (ALT 636 FOR OP/ED): Performed by: ANESTHESIOLOGIST ASSISTANT

## 2025-01-06 RX ORDER — HEPARIN SODIUM 5000 [USP'U]/ML
5000 INJECTION, SOLUTION INTRAVENOUS; SUBCUTANEOUS EVERY 8 HOURS
Status: DISCONTINUED | OUTPATIENT
Start: 2025-01-06 | End: 2025-01-06 | Stop reason: HOSPADM

## 2025-01-06 RX ORDER — OXYCODONE AND ACETAMINOPHEN 5; 325 MG/1; MG/1
1 TABLET ORAL EVERY 4 HOURS PRN
Status: DISCONTINUED | OUTPATIENT
Start: 2025-01-06 | End: 2025-01-06 | Stop reason: HOSPADM

## 2025-01-06 RX ORDER — ONDANSETRON 4 MG/1
4 TABLET, FILM COATED ORAL EVERY 8 HOURS PRN
Qty: 10 TABLET | Refills: 0 | Status: SHIPPED | OUTPATIENT
Start: 2025-01-06

## 2025-01-06 RX ORDER — PANTOPRAZOLE SODIUM 40 MG/10ML
40 INJECTION, POWDER, LYOPHILIZED, FOR SOLUTION INTRAVENOUS
Status: DISCONTINUED | OUTPATIENT
Start: 2025-01-06 | End: 2025-01-06 | Stop reason: HOSPADM

## 2025-01-06 RX ORDER — ALBUTEROL SULFATE 0.83 MG/ML
2.5 SOLUTION RESPIRATORY (INHALATION) ONCE AS NEEDED
Status: DISCONTINUED | OUTPATIENT
Start: 2025-01-06 | End: 2025-01-06 | Stop reason: HOSPADM

## 2025-01-06 RX ORDER — BUPIVACAINE HYDROCHLORIDE 5 MG/ML
INJECTION, SOLUTION PERINEURAL AS NEEDED
Status: DISCONTINUED | OUTPATIENT
Start: 2025-01-06 | End: 2025-01-06 | Stop reason: HOSPADM

## 2025-01-06 RX ORDER — MIDAZOLAM HYDROCHLORIDE 1 MG/ML
INJECTION INTRAMUSCULAR; INTRAVENOUS AS NEEDED
Status: DISCONTINUED | OUTPATIENT
Start: 2025-01-06 | End: 2025-01-06

## 2025-01-06 RX ORDER — METOCLOPRAMIDE HYDROCHLORIDE 5 MG/ML
10 INJECTION INTRAMUSCULAR; INTRAVENOUS ONCE
Status: COMPLETED | OUTPATIENT
Start: 2025-01-06 | End: 2025-01-06

## 2025-01-06 RX ORDER — LIDOCAINE HYDROCHLORIDE 20 MG/ML
INJECTION, SOLUTION INFILTRATION; PERINEURAL AS NEEDED
Status: DISCONTINUED | OUTPATIENT
Start: 2025-01-06 | End: 2025-01-06

## 2025-01-06 RX ORDER — FENTANYL CITRATE 50 UG/ML
INJECTION, SOLUTION INTRAMUSCULAR; INTRAVENOUS AS NEEDED
Status: DISCONTINUED | OUTPATIENT
Start: 2025-01-06 | End: 2025-01-06

## 2025-01-06 RX ORDER — DIPHENHYDRAMINE HYDROCHLORIDE 50 MG/ML
INJECTION INTRAMUSCULAR; INTRAVENOUS AS NEEDED
Status: DISCONTINUED | OUTPATIENT
Start: 2025-01-06 | End: 2025-01-06

## 2025-01-06 RX ORDER — HYDROMORPHONE HYDROCHLORIDE 1 MG/ML
INJECTION, SOLUTION INTRAMUSCULAR; INTRAVENOUS; SUBCUTANEOUS AS NEEDED
Status: DISCONTINUED | OUTPATIENT
Start: 2025-01-06 | End: 2025-01-06

## 2025-01-06 RX ORDER — PANTOPRAZOLE SODIUM 40 MG/1
40 TABLET, DELAYED RELEASE ORAL
Status: DISCONTINUED | OUTPATIENT
Start: 2025-01-06 | End: 2025-01-06 | Stop reason: HOSPADM

## 2025-01-06 RX ORDER — CEFAZOLIN 1 G/1
INJECTION, POWDER, FOR SOLUTION INTRAVENOUS AS NEEDED
Status: DISCONTINUED | OUTPATIENT
Start: 2025-01-06 | End: 2025-01-06

## 2025-01-06 RX ORDER — KETOROLAC TROMETHAMINE 30 MG/ML
15 INJECTION, SOLUTION INTRAMUSCULAR; INTRAVENOUS EVERY 6 HOURS
Status: DISCONTINUED | OUTPATIENT
Start: 2025-01-06 | End: 2025-01-06

## 2025-01-06 RX ORDER — POLYETHYLENE GLYCOL 3350 17 G/17G
17 POWDER, FOR SOLUTION ORAL DAILY PRN
Status: DISCONTINUED | OUTPATIENT
Start: 2025-01-06 | End: 2025-01-06 | Stop reason: HOSPADM

## 2025-01-06 RX ORDER — ACETAMINOPHEN 325 MG/1
650 TABLET ORAL EVERY 4 HOURS PRN
Status: DISCONTINUED | OUTPATIENT
Start: 2025-01-06 | End: 2025-01-06 | Stop reason: HOSPADM

## 2025-01-06 RX ORDER — KETOROLAC TROMETHAMINE 30 MG/ML
15 INJECTION, SOLUTION INTRAMUSCULAR; INTRAVENOUS EVERY 6 HOURS PRN
Status: DISCONTINUED | OUTPATIENT
Start: 2025-01-06 | End: 2025-01-06 | Stop reason: HOSPADM

## 2025-01-06 RX ORDER — SODIUM CHLORIDE, SODIUM LACTATE, POTASSIUM CHLORIDE, CALCIUM CHLORIDE 600; 310; 30; 20 MG/100ML; MG/100ML; MG/100ML; MG/100ML
INJECTION, SOLUTION INTRAVENOUS CONTINUOUS PRN
Status: DISCONTINUED | OUTPATIENT
Start: 2025-01-06 | End: 2025-01-06

## 2025-01-06 RX ORDER — ROCURONIUM BROMIDE 10 MG/ML
INJECTION, SOLUTION INTRAVENOUS AS NEEDED
Status: DISCONTINUED | OUTPATIENT
Start: 2025-01-06 | End: 2025-01-06

## 2025-01-06 RX ORDER — ONDANSETRON HYDROCHLORIDE 2 MG/ML
4 INJECTION, SOLUTION INTRAVENOUS ONCE AS NEEDED
Status: DISCONTINUED | OUTPATIENT
Start: 2025-01-06 | End: 2025-01-06 | Stop reason: HOSPADM

## 2025-01-06 RX ORDER — OXYCODONE AND ACETAMINOPHEN 5; 325 MG/1; MG/1
1 TABLET ORAL EVERY 6 HOURS PRN
Qty: 6 TABLET | Refills: 0 | Status: SHIPPED | OUTPATIENT
Start: 2025-01-06 | End: 2025-01-12

## 2025-01-06 RX ORDER — OXYCODONE HYDROCHLORIDE 5 MG/1
5 TABLET ORAL EVERY 4 HOURS PRN
Status: DISCONTINUED | OUTPATIENT
Start: 2025-01-06 | End: 2025-01-06

## 2025-01-06 RX ORDER — KETOROLAC TROMETHAMINE 30 MG/ML
15 INJECTION, SOLUTION INTRAMUSCULAR; INTRAVENOUS EVERY 6 HOURS PRN
Status: DISCONTINUED | OUTPATIENT
Start: 2025-01-06 | End: 2025-01-06

## 2025-01-06 RX ORDER — OXYCODONE HYDROCHLORIDE 5 MG/1
5 TABLET ORAL EVERY 4 HOURS PRN
Status: DISCONTINUED | OUTPATIENT
Start: 2025-01-06 | End: 2025-01-06 | Stop reason: HOSPADM

## 2025-01-06 RX ORDER — ONDANSETRON 4 MG/1
4 TABLET, FILM COATED ORAL EVERY 8 HOURS PRN
Status: DISCONTINUED | OUTPATIENT
Start: 2025-01-06 | End: 2025-01-06 | Stop reason: HOSPADM

## 2025-01-06 RX ORDER — NAPROXEN 250 MG/1
250 TABLET ORAL
COMMUNITY

## 2025-01-06 RX ORDER — POLYETHYLENE GLYCOL 3350 17 G/17G
17 POWDER, FOR SOLUTION ORAL DAILY
Status: DISCONTINUED | OUTPATIENT
Start: 2025-01-06 | End: 2025-01-06 | Stop reason: HOSPADM

## 2025-01-06 RX ORDER — PROPOFOL 10 MG/ML
INJECTION, EMULSION INTRAVENOUS AS NEEDED
Status: DISCONTINUED | OUTPATIENT
Start: 2025-01-06 | End: 2025-01-06

## 2025-01-06 RX ORDER — ONDANSETRON HYDROCHLORIDE 2 MG/ML
4 INJECTION, SOLUTION INTRAVENOUS EVERY 8 HOURS PRN
Status: DISCONTINUED | OUTPATIENT
Start: 2025-01-06 | End: 2025-01-06 | Stop reason: HOSPADM

## 2025-01-06 RX ADMIN — HYDROMORPHONE HYDROCHLORIDE 1 MG: 1 INJECTION, SOLUTION INTRAMUSCULAR; INTRAVENOUS; SUBCUTANEOUS at 14:30

## 2025-01-06 RX ADMIN — FENTANYL CITRATE 50 MCG: 50 INJECTION, SOLUTION INTRAMUSCULAR; INTRAVENOUS at 14:14

## 2025-01-06 RX ADMIN — SUGAMMADEX 200 MG: 100 INJECTION, SOLUTION INTRAVENOUS at 14:50

## 2025-01-06 RX ADMIN — PROPOFOL 200 MG: 10 INJECTION, EMULSION INTRAVENOUS at 14:10

## 2025-01-06 RX ADMIN — METOCLOPRAMIDE 10 MG: 5 INJECTION, SOLUTION INTRAMUSCULAR; INTRAVENOUS at 10:23

## 2025-01-06 RX ADMIN — OXYCODONE HYDROCHLORIDE AND ACETAMINOPHEN 1 TABLET: 5; 325 TABLET ORAL at 20:18

## 2025-01-06 RX ADMIN — SODIUM CHLORIDE, POTASSIUM CHLORIDE, SODIUM LACTATE AND CALCIUM CHLORIDE: 600; 310; 30; 20 INJECTION, SOLUTION INTRAVENOUS at 14:06

## 2025-01-06 RX ADMIN — MIDAZOLAM HYDROCHLORIDE 2 MG: 1 INJECTION, SOLUTION INTRAMUSCULAR; INTRAVENOUS at 13:52

## 2025-01-06 RX ADMIN — FENTANYL CITRATE 50 MCG: 50 INJECTION, SOLUTION INTRAMUSCULAR; INTRAVENOUS at 14:23

## 2025-01-06 RX ADMIN — CEFAZOLIN 2 G: 330 INJECTION, POWDER, FOR SOLUTION INTRAMUSCULAR; INTRAVENOUS at 14:09

## 2025-01-06 RX ADMIN — ONDANSETRON 4 MG: 2 INJECTION, SOLUTION INTRAMUSCULAR; INTRAVENOUS at 05:36

## 2025-01-06 RX ADMIN — KETOROLAC TROMETHAMINE 30 MG: 30 INJECTION, SOLUTION INTRAMUSCULAR at 14:55

## 2025-01-06 RX ADMIN — DIPHENHYDRAMINE HYDROCHLORIDE 25 MG: 50 INJECTION, SOLUTION INTRAMUSCULAR; INTRAVENOUS at 14:19

## 2025-01-06 RX ADMIN — LIDOCAINE HYDROCHLORIDE 100 MG: 20 INJECTION, SOLUTION INFILTRATION; PERINEURAL at 14:10

## 2025-01-06 RX ADMIN — ROCURONIUM BROMIDE 50 MG: 10 INJECTION, SOLUTION INTRAVENOUS at 14:10

## 2025-01-06 RX ADMIN — DEXAMETHASONE SODIUM PHOSPHATE 4 MG: 4 INJECTION, SOLUTION INTRAMUSCULAR; INTRAVENOUS at 14:23

## 2025-01-06 RX ADMIN — ONDANSETRON 4 MG: 2 INJECTION, SOLUTION INTRAMUSCULAR; INTRAVENOUS at 14:47

## 2025-01-06 RX ADMIN — PANTOPRAZOLE SODIUM 40 MG: 40 INJECTION, POWDER, FOR SOLUTION INTRAVENOUS at 10:51

## 2025-01-06 SDOH — SOCIAL STABILITY: SOCIAL INSECURITY
WITHIN THE LAST YEAR, HAVE YOU BEEN RAPED OR FORCED TO HAVE ANY KIND OF SEXUAL ACTIVITY BY YOUR PARTNER OR EX-PARTNER?: NO

## 2025-01-06 SDOH — SOCIAL STABILITY: SOCIAL INSECURITY: WITHIN THE LAST YEAR, HAVE YOU BEEN HUMILIATED OR EMOTIONALLY ABUSED IN OTHER WAYS BY YOUR PARTNER OR EX-PARTNER?: NO

## 2025-01-06 SDOH — SOCIAL STABILITY: SOCIAL INSECURITY: DO YOU FEEL UNSAFE GOING BACK TO THE PLACE WHERE YOU ARE LIVING?: NO

## 2025-01-06 SDOH — ECONOMIC STABILITY: FOOD INSECURITY: WITHIN THE PAST 12 MONTHS, YOU WORRIED THAT YOUR FOOD WOULD RUN OUT BEFORE YOU GOT THE MONEY TO BUY MORE.: NEVER TRUE

## 2025-01-06 SDOH — ECONOMIC STABILITY: INCOME INSECURITY: IN THE PAST 12 MONTHS HAS THE ELECTRIC, GAS, OIL, OR WATER COMPANY THREATENED TO SHUT OFF SERVICES IN YOUR HOME?: NO

## 2025-01-06 SDOH — SOCIAL STABILITY: SOCIAL INSECURITY: HAS ANYONE EVER THREATENED TO HURT YOUR FAMILY OR YOUR PETS?: NO

## 2025-01-06 SDOH — ECONOMIC STABILITY: FOOD INSECURITY: WITHIN THE PAST 12 MONTHS, THE FOOD YOU BOUGHT JUST DIDN'T LAST AND YOU DIDN'T HAVE MONEY TO GET MORE.: NEVER TRUE

## 2025-01-06 SDOH — SOCIAL STABILITY: SOCIAL INSECURITY
WITHIN THE LAST YEAR, HAVE YOU BEEN KICKED, HIT, SLAPPED, OR OTHERWISE PHYSICALLY HURT BY YOUR PARTNER OR EX-PARTNER?: NO

## 2025-01-06 SDOH — SOCIAL STABILITY: SOCIAL INSECURITY: HAVE YOU HAD ANY THOUGHTS OF HARMING ANYONE ELSE?: NO

## 2025-01-06 SDOH — SOCIAL STABILITY: SOCIAL INSECURITY: WERE YOU ABLE TO COMPLETE ALL THE BEHAVIORAL HEALTH SCREENINGS?: YES

## 2025-01-06 SDOH — SOCIAL STABILITY: SOCIAL INSECURITY: HAVE YOU HAD THOUGHTS OF HARMING ANYONE ELSE?: NO

## 2025-01-06 SDOH — SOCIAL STABILITY: SOCIAL INSECURITY: WITHIN THE LAST YEAR, HAVE YOU BEEN AFRAID OF YOUR PARTNER OR EX-PARTNER?: NO

## 2025-01-06 SDOH — SOCIAL STABILITY: SOCIAL INSECURITY: DO YOU FEEL ANYONE HAS EXPLOITED OR TAKEN ADVANTAGE OF YOU FINANCIALLY OR OF YOUR PERSONAL PROPERTY?: NO

## 2025-01-06 SDOH — SOCIAL STABILITY: SOCIAL INSECURITY: DOES ANYONE TRY TO KEEP YOU FROM HAVING/CONTACTING OTHER FRIENDS OR DOING THINGS OUTSIDE YOUR HOME?: NO

## 2025-01-06 SDOH — SOCIAL STABILITY: SOCIAL INSECURITY: ABUSE: ADULT

## 2025-01-06 SDOH — SOCIAL STABILITY: SOCIAL INSECURITY: ARE THERE ANY APPARENT SIGNS OF INJURIES/BEHAVIORS THAT COULD BE RELATED TO ABUSE/NEGLECT?: NO

## 2025-01-06 SDOH — SOCIAL STABILITY: SOCIAL INSECURITY: ARE YOU OR HAVE YOU BEEN THREATENED OR ABUSED PHYSICALLY, EMOTIONALLY, OR SEXUALLY BY ANYONE?: NO

## 2025-01-06 ASSESSMENT — ACTIVITIES OF DAILY LIVING (ADL)
PATIENT'S MEMORY ADEQUATE TO SAFELY COMPLETE DAILY ACTIVITIES?: YES
GROOMING: INDEPENDENT
LACK_OF_TRANSPORTATION: NO
JUDGMENT_ADEQUATE_SAFELY_COMPLETE_DAILY_ACTIVITIES: YES
LACK_OF_TRANSPORTATION: NO
BATHING: INDEPENDENT
HEARING - LEFT EAR: FUNCTIONAL
FEEDING YOURSELF: INDEPENDENT
TOILETING: INDEPENDENT
HEARING - RIGHT EAR: FUNCTIONAL
ADEQUATE_TO_COMPLETE_ADL: YES
DRESSING YOURSELF: INDEPENDENT
WALKS IN HOME: INDEPENDENT

## 2025-01-06 ASSESSMENT — PAIN DESCRIPTION - LOCATION
LOCATION: ABDOMEN
LOCATION: ABDOMEN

## 2025-01-06 ASSESSMENT — LIFESTYLE VARIABLES
AUDIT-C TOTAL SCORE: 0
HOW MANY STANDARD DRINKS CONTAINING ALCOHOL DO YOU HAVE ON A TYPICAL DAY: PATIENT DOES NOT DRINK
AUDIT-C TOTAL SCORE: 0
HOW OFTEN DO YOU HAVE 6 OR MORE DRINKS ON ONE OCCASION: NEVER
PRESCIPTION_ABUSE_PAST_12_MONTHS: NO
SUBSTANCE_ABUSE_PAST_12_MONTHS: NO
SKIP TO QUESTIONS 9-10: 1
HOW OFTEN DO YOU HAVE A DRINK CONTAINING ALCOHOL: NEVER

## 2025-01-06 ASSESSMENT — COGNITIVE AND FUNCTIONAL STATUS - GENERAL
PATIENT BASELINE BEDBOUND: NO
MOBILITY SCORE: 24
DAILY ACTIVITIY SCORE: 24

## 2025-01-06 ASSESSMENT — PAIN - FUNCTIONAL ASSESSMENT
PAIN_FUNCTIONAL_ASSESSMENT: 0-10
PAIN_FUNCTIONAL_ASSESSMENT: UNABLE TO SELF-REPORT

## 2025-01-06 ASSESSMENT — PATIENT HEALTH QUESTIONNAIRE - PHQ9
1. LITTLE INTEREST OR PLEASURE IN DOING THINGS: NOT AT ALL
2. FEELING DOWN, DEPRESSED OR HOPELESS: NOT AT ALL
SUM OF ALL RESPONSES TO PHQ9 QUESTIONS 1 & 2: 0

## 2025-01-06 ASSESSMENT — PAIN SCALES - GENERAL
PAINLEVEL_OUTOF10: 0 - NO PAIN
PAINLEVEL_OUTOF10: 7
PAINLEVEL_OUTOF10: 0 - NO PAIN
PAINLEVEL_OUTOF10: 2

## 2025-01-06 ASSESSMENT — PAIN SCALES - PAIN ASSESSMENT IN ADVANCED DEMENTIA (PAINAD): TOTALSCORE: MEDICATION (SEE MAR)

## 2025-01-06 NOTE — CONSULTS
Reason For Consult  Acute cholecystitis    Assessment/Plan   Impression:  Symptomatic Cholelithiasis/ acute cholecystitis    -I carefully explained the pathophysiology of biliary tract disease using terms and pictures  -Rationale for surgical intervention described  -Technique of laparoscopic cholecystectomy explained (both standard and robotic assisted)  -Risks of surgery elucidated (bleeding, infection, bile leak, CBD injury, etc)  -Other options presented (watchful waiting, avoidance of fatty foods)  -All patient questions answered to her satisfaction.  -Patient has indicated desire to pursue surgical intervention  -Will try to add onto OR schedule today      History Of Present Illness  Darshana Tapia is a 24 y.o. female presenting with several days of epigastric right upper quadrant pain radiating to her back.  She was actually here on Friday night and was found to have gallstones but they were able to control her pain and had her follow-up with surgery clinic.  Unfortunately she had recurrent severe pain yesterday and came back into the ER..  Otherwise pretty healthy.  Has not had abdominal surgery before.  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  Non-smoker.     Past Medical History  She has a past medical history of Bronchitis (09/15/2023), Ear pain (09/15/2023), Obesity (09/15/2023), Personal history of other specified conditions (09/14/2021), Right ankle pain (09/15/2023), and Unstable ankle (09/15/2023).    Surgical History  She has a past surgical history that includes Other surgical history (03/13/2020).     Social History  She reports that she has never smoked. She has never used smokeless tobacco. She reports that she does not drink alcohol and does not use drugs.    Family History  No family history on file.     Allergies  Patient has no known allergies.    Review of Systems  Constitutional: no weight loss, no fevers, no malaise  HEENT: negative  Neck:  "negative  Pulmonary: no SOB, no cough  CV: no chest pain, otherwise negative  GI: See HPI  : no hematuria, retention.  MS: no aches/pains  Neurologic: negative  Skin: no rashes, lesions  HEME: no bleeding tendency, no bruising  Psych: no mood issues    Physical Exam  Constitutional: Mild distress.  Alert and oriented x 3.    Skin: non jaundiced  HEENT: normal  Lungs: clear to auscultation  CV: RRR, S1S2, no murmurs  Abdomen: soft, mild discomfort RUQ.  No obvious hernias.  No diffuse peritoneal signs  MS: grossly normal  Neuro: grossly normal  Last Recorded Vitals  Blood pressure 109/65, pulse 78, temperature 36.5 °C (97.7 °F), temperature source Oral, resp. rate 17, height 1.549 m (5' 1\"), weight 99.8 kg (220 lb), last menstrual period 12/29/2024, SpO2 96%.    Relevant Results  Lab Results   Component Value Date    WBC 3.8 (L) 01/06/2025    HGB 12.3 01/06/2025    HCT 36.1 01/06/2025    MCV 86 01/06/2025     01/06/2025         Sodium   Date Value Ref Range Status   01/06/2025 139 136 - 145 mmol/L Final     Chloride   Date Value Ref Range Status   01/06/2025 108 (H) 98 - 107 mmol/L Final     Urea Nitrogen   Date Value Ref Range Status   01/06/2025 11 6 - 23 mg/dL Final         Lab Results   Component Value Date    K 3.8 01/06/2025    CALCIUM 8.8 01/06/2025      No results found for this or any previous visit from the past 3 days.          I spent 30 minutes in the professional and overall care of this patient.        "

## 2025-01-06 NOTE — PROGRESS NOTES
Transitional Care Coordination Progress Note:  Plan per Medical/Surgical team: treatment of abd pain, N/V, elias with protonix, morphine, zofran, NPO, surgery consult   Status: Observation  Payor source: self pay  Discharge disposition: Home with mother  Potential Barriers: ?surgery  ADOD: 1/7/2025  TRISTIN Blount RN, BSN Transitional Care Coordinator ED# [phone]     When patient is medically ready for discharge  They are being discharged to: home  Mother will pick patient up  No SNF  No HHC  No DME  No O2  No Wounds     01/06/25 0650   Discharge Planning   Living Arrangements Parent   Support Systems Parent   Assistance Needed surgery consult   Type of Residence Private residence   Number of Stairs to Enter Residence 5   Number of Stairs Within Residence 14   Home or Post Acute Services None   Expected Discharge Disposition Home   Does the patient need discharge transport arranged? No   Financial Resource Strain   How hard is it for you to pay for the very basics like food, housing, medical care, and heating? Not hard   Housing Stability   In the last 12 months, was there a time when you were not able to pay the mortgage or rent on time? N   In the past 12 months, how many times have you moved where you were living? 1   At any time in the past 12 months, were you homeless or living in a shelter (including now)? N   Transportation Needs   In the past 12 months, has lack of transportation kept you from medical appointments or from getting medications? no   In the past 12 months, has lack of transportation kept you from meetings, work, or from getting things needed for daily living? No   Patient Choice   Provider Choice list and CMS website (https://medicare.gov/care-compare#search) for post-acute Quality and Resource Measure Data were provided and reviewed with: Patient   Patient / Family choosing to utilize agency / facility established prior to hospitalization No   Stroke Family Assessment   Stroke Family  Assessment Needed No   Intensity of Service   Intensity of Service 0-30 min

## 2025-01-06 NOTE — OP NOTE
CHOLECYSTECTOMY, LAPAROSCOPIC, WITH CHOLANGIOGRAM Operative Note     Date: 2025  OR Location: [facility] OR    Name: Darshana Tapia, : 2000, Age: 24 y.o., MRN: [id number], Sex: female    Diagnosis  Pre-op Diagnosis      * Calculus of gallbladder without cholecystitis without obstruction [K80.20] Post-op Diagnosis     * Calculus of gallbladder without cholecystitis without obstruction [K80.20]     Procedures  CHOLECYSTECTOMY, LAPAROSCOPIC, WITH CHOLANGIOGRAM  50063 - ND LAPS SURG CHOLECYSTECTOMY W/CHOLANGIOGRAPHY      Surgeons      * Candelario Smith - Primary    Resident/Fellow/Other Assistant:  Surgeons and Role:  * No surgeons found with a matching role *    Staff:   Circulator: Glo Chambers Person: Marti Chambers Person: Radha    Anesthesia Staff: Anesthesiologist: Ron Bustamante MD  C-AA: FADY Mcleod    Procedure Summary  Anesthesia: General  ASA: II  Estimated Blood Loss: 3mL  Intra-op Medications:   Administrations occurring from 1330 to 1510 on 25:   Medication Name Total Dose   ioversol (Optiray-320) injection 10 mL   BUPivacaine HCl (Marcaine) 0.5 % (5 mg/mL) injection 16 mL   ketorolac (Toradol) injection 15 mg 30 mg   ceFAZolin (Ancef) vial 1 g 2 g   dexAMETHasone (Decadron) injection 4 mg/mL 4 mg   diphenhydrAMINE (BENADryl) injection 25 mg   fentaNYL (Sublimaze) injection 50 mcg/mL 100 mcg   HYDROmorphone (Dilaudid) injection 1 mg/mL 1 mg   LR infusion Cannot be calculated   lidocaine (Xylocaine) injection 2 % 100 mg   midazolam PF (Versed) injection 1 mg/mL 2 mg   propofol (Diprivan) injection 10 mg/mL 200 mg   rocuronium (ZeMuron) 50 mg/5 mL injection 50 mg   sugammadex (Bridion) 200 mg/2 mL injection 200 mg              Anesthesia Record               Intraprocedure I/O Totals          Intake    LR infusion 900.00 mL    Total Intake 900 mL       Output    Est. Blood Loss 10 mL    Total Output 10 mL       Net    Net Volume 890 mL          Specimen:   ID Type Source Tests  Collected by Time   1 : GALLBLADDER Tissue GALLBLADDER CHOLECYSTECTOMY SURGICAL PATHOLOGY EXAM Candelario Smith MD 1/6/2025 1428                 Drains and/or Catheters: * None in log *    Tourniquet Times:         Implants:     Findings: Massively distended gallbladder with stones.  Cholangiogram normal    Indications: Darshana Tapia is an 24 y.o. female who is having surgery for Calculus of gallbladder without cholecystitis without obstruction [K80.20].     The patient was seen in the preoperative area. The risks, benefits, complications, treatment options, non-operative alternatives, expected recovery and outcomes were discussed with the patient. The possibilities of reaction to medication, pulmonary aspiration, injury to surrounding structures, bleeding, recurrent infection, the need for additional procedures, failure to diagnose a condition, and creating a complication requiring transfusion or operation were discussed with the patient. The patient concurred with the proposed plan, giving informed consent.  The site of surgery was properly noted/marked if necessary per policy. The patient has been actively warmed in preoperative area. Preoperative antibiotics have been ordered and given within 1 hours of incision. Venous thrombosis prophylaxis have been ordered including bilateral sequential compression devices    Procedure Details:  Description:  Patient was brought to the operating room placed supine on the table.  Timeout was performed which confirmed patient and procedure.  Perioperative antibiotics were administered.  Gen. anesthesia was administered through an endotracheal tube.  The abdomen was prepped and draped sterilely.    I injected half percent Marcaine and then made a sharp infraumbilical incision with the knife.  Sharp incision was made in the fascia.  The peritoneal cavity was entered under direct visualization and a Monico port was placed.  The abdomen was insufflated and the patient was  placed in steep reverse Trendelenburg.  A 5 mm 30° camera was introduced.  I placed a right upper quadrant 5 mm port and another 5 mL port in the midline subxiphoid area.  The gallbladder was visualized.  It was grasped and retracted superiorly into the right.  Meticulous dissection was then performed in the area of Calot triangle.  Critical view was achieved.  Posterior cystic plate visualized.  The cystic artery and cystic duct were skeletonized.  The artery was divided between hemoclips.  Endo Clip placed on the gallbladder side and then made a ductotomy with EndoShears.  Ranfac cholangiocatheter was inserted through a separate angiocatheter sheath.  Cholangiogram was obtained using C-arm fluoroscopy.  The anatomy was noted to be normal.  No obvious filling defects seen.  Contrast went easily into the duodenum.  Ranfac catheter removed.  I placed 3 clips on the distal cystic duct and one toward the gallbladder and divided with EndoShears.  The gallbladder was then dissected atraumatically out of the liver bed with hook cautery.  Once it was liberated it was placed in the Endo Catch bag and brought out through the umbilicus.  Liver bed was inspected and noted to be hemostatic.  Clips were noted to be in good position.  Gentle irrigation was performed.  The effluent was noted to be clear.  The ports were removed under direct visualization.  The abdomen was desufflated.  The fascia at the umbilicus was closed with 0 Vicryl.  Wounds were irrigated.  Skin incisions were closed with 4-0 Biosyn and Dermabond.  Patient was ultimately extubated and transferred to the recovery room in satisfactory condition.      Complications:  None; patient tolerated the procedure well.    Disposition: PACU - hemodynamically stable.  Condition: stable                 Additional Details:     Attending Attestation: I was present for the entire procedure.    Candelario Smith  Phone Number: [phone]

## 2025-01-06 NOTE — ED PROVIDER NOTES
HPI   Chief Complaint   Patient presents with    Abdominal Pain    Nausea       HPI: 24-year-old female with a history of recently diagnosed cholelithiasis presents for epigastric and right upper quadrant pain.  She was seen in this ED 2 days ago and an ultrasound of the right upper quadrant showed cholelithiasis without cholecystitis.  She was discharged to home but since then has had increased pain and nausea.  She denies vomiting, fever, chills.      Limitations to history: None  Independent Historians: Patient  External Records Reviewed: HIE, outpatient notes, inpatient notes  ------------------------------------------------------------------------------------------------------------------------------------------  ROS: a ten point review of systems was performed and was negative except as per HPI.  ------------------------------------------------------------------------------------------------------------------------------------------  PMH / PSH: as per HPI, otherwise reviewed in EMR  MEDS: as per HPI, otherwise reviewed in EMR  ALLERGIES: as per HPI, otherwise reviewed in EMR  SocH:  as per HPI, otherwise reviewed in EMR  FH:  as per HPI, otherwise reviewed in EMR  ------------------------------------------------------------------------------------------------------------------------------------------  Physical Exam:  VS: As documented in the triage note and EMR flowsheet from this visit was reviewed  General: Well appearing. No acute distress.   Eyes:  Extraocular movements grossly intact. No scleral icterus. No discharge  HEENT:  Normocephalic.  Atraumatic  Neck: Moves neck freely. No gross masses  CV: Regular rhythm. No murmurs, rubs or gallops   Resp: Clear to auscultation bilaterally. No respiratory distress.    GI: Soft, no masses.  Tenderness to palpation over the right upper quadrant and epigastrium.  Negative Quintana sign  MSK: Symmetric muscle bulk. No deformities. No lower extremity edema.    Skin:  Warm, dry, intact.   Neuro: No focal deficits.  A&O x3.   Psych: Appropriate for situation  ------------------------------------------------------------------------------------------------------------------------------------------  Hospital Course / Medical Decision Making:  Independent Interpretations: US gallbladder  EKG as interpreted by me: ALEC    MDM: 24-year-old female with a history of recently diagnosed cholelithiasis presents for epigastric pain or right upper quadrant pain.  She was seen in this ED 2 days ago.  Ultrasound today shows cholelithiasis without cholecystitis but she does have a new developing transaminitis.  I conducted shared decision making with the patient and she is having difficulty tolerating her symptoms at home.  The patient was admitted to the observation unit to see general surgery in the morning to be consulted for a possible laparoscopic cholecystectomy    Discussion of Management with Other Providers:   I discussed the patient/results with: Emergency medicine team    Final diagnosis and disposition as below.    Results for orders placed or performed during the hospital encounter of 01/05/25  -CBC and Auto Differential:   Collection Time: 01/05/25 10:35 PM       Result                      Value             Ref Range           WBC                         10.8              4.4 - 11.3 x*       nRBC                        0.0               0.0 - 0.0 /1*       RBC                         4.57              4.00 - 5.20 *       Hemoglobin                  13.4              12.0 - 16.0 *       Hematocrit                  40.7              36.0 - 46.0 %       MCV                         89                80 - 100 fL         MCH                         29.3              26.0 - 34.0 *       MCHC                        32.9              32.0 - 36.0 *       RDW                         13.0              11.5 - 14.5 %       Platelets                   230               150 - 450 x1*        Neutrophils %               66.5              40.0 - 80.0 %       Immature Granulocytes *     0.4               0.0 - 0.9 %         Lymphocytes %               24.4              13.0 - 44.0 %       Monocytes %                 7.6               2.0 - 10.0 %        Eosinophils %               0.6               0.0 - 6.0 %         Basophils %                 0.5               0.0 - 2.0 %         Neutrophils Absolute        7.18              1.20 - 7.70 *       Immature Granulocytes *     0.04              0.00 - 0.70 *       Lymphocytes Absolute        2.63              1.20 - 4.80 *       Monocytes Absolute          0.82              0.10 - 1.00 *       Eosinophils Absolute        0.06              0.00 - 0.70 *       Basophils Absolute          0.05              0.00 - 0.10 *  -Comprehensive metabolic panel:   Collection Time: 01/06/25 12:14 AM       Result                      Value             Ref Range           Glucose                     97                74 - 99 mg/dL       Sodium                      138               136 - 145 mm*       Potassium                   3.8               3.5 - 5.3 mm*       Chloride                    106               98 - 107 mmo*       Bicarbonate                 25                21 - 32 mmol*       Anion Gap                   11                10 - 20 mmol*       Urea Nitrogen               13                6 - 23 mg/dL        Creatinine                  0.67              0.50 - 1.05 *       eGFR                        >90               >60 mL/min/1*       Calcium                     9.1               8.6 - 10.3 m*       Albumin                     3.8               3.4 - 5.0 g/*       Alkaline Phosphatase        65                33 - 110 U/L        Total Protein               7.2               6.4 - 8.2 g/*       AST                         169 (H)           9 - 39 U/L          Bilirubin, Total            0.7               0.0 - 1.2 mg*       ALT                         80  (H)            7 - 45 U/L     -Lipase:   Collection Time: 01/06/25 12:14 AM       Result                      Value             Ref Range           Lipase                      18                9 - 82 U/L     -Human Chorionic Gonadotropin, Serum Quantitative:   Collection Time: 01/06/25 12:14 AM       Result                      Value             Ref Range           HCG, Beta-Quantitative      <2                <5 mIU/mL      -Urinalysis with Reflex Culture and Microscopic:   Collection Time: 01/06/25 12:15 AM       Result                      Value             Ref Range           Color, Urine                Light-Yellow      Light-Yellow*       Appearance, Urine           Clear             Clear               Specific Gravity, Urine     1.021             1.005 - 1.035       pH, Urine                   6.0               5.0, 5.5, 6.*       Protein, Urine              NEGATIVE          NEGATIVE, 10*       Glucose, Urine              Normal            Normal mg/dL        Blood, Urine                NEGATIVE          NEGATIVE            Ketones, Urine              NEGATIVE          NEGATIVE mg/*       Bilirubin, Urine            NEGATIVE          NEGATIVE            Urobilinogen, Urine         Normal            Normal mg/dL        Nitrite, Urine              NEGATIVE          NEGATIVE            Leukocyte Esterase, Ur*     NEGATIVE          NEGATIVE       US gallbladder   Final Result    1. Cholelithiasis with no other sonographic evidence for acute    cholecystitis.    2. Fatty infiltration of the liver.          MACRO:    None                Signed by: Jocelyn Guzman 1/6/2025 12:04 AM    Dictation workstation:   [id number]                   Patient History   Past Medical History:   Diagnosis Date    Bronchitis 09/15/2023    Ear pain 09/15/2023    Obesity 09/15/2023    Personal history of other specified conditions 09/14/2021    History of abdominal pain    Right ankle pain 09/15/2023    Unstable ankle 09/15/2023      Past Surgical History:   Procedure Laterality Date    OTHER SURGICAL HISTORY  03/13/2020    Finger surgical procedure     No family history on file.  Social History     Tobacco Use    Smoking status: Never    Smokeless tobacco: Never   Vaping Use    Vaping status: Never Used   Substance Use Topics    Alcohol use: Never    Drug use: Never       Physical Exam   ED Triage Vitals [01/05/25 2104]   Temperature Heart Rate Respirations BP   36 °C (96.8 °F) 82 16 139/80      Pulse Ox Temp Source Heart Rate Source Patient Position   98 % Skin Monitor Sitting      BP Location FiO2 (%)     Right arm --       Physical Exam      ED Course & MDM   Diagnoses as of 01/12/25 1456   Calculus of gallbladder without cholecystitis without obstruction                 No data recorded     Chana Coma Scale Score: 15 (01/05/25 2213 : Lela Jain RN)                           Medical Decision Making      Procedure  Procedures     Yony Jon DO  01/12/25 1502

## 2025-01-06 NOTE — PROGRESS NOTES
"Medicine PA-C follow up note    Subjective:  Pt seen in the AM prior to surgery - was feeling nauseated and nervous about surgery. Provided reassurance and ordered additional antiemetic meds     Additional information:      Vitals (Last 24 Hours):  Heart Rate:  [70-98]   Temp:  [36 °C (96.8 °F)-36.8 °C (98.3 °F)]   Resp:  [12-18]   BP: ()/(55-85)   Height:  [154.9 cm (5' 1\")]   Weight:  [99.8 kg (220 lb)]   SpO2:  [96 %-99 %]       I have reviewed all imaging reports and labs pertinent to this visit / presenting problem    PHYSICAL EXAM:  Constitutional: alert and cooperative  Eyes: no icterus  ENMT: mucous membranes moist, no lesions  Head/Neck: supple  Respiratory/Thorax: CTA bilaterally, non-labored breathing, no cough, on RA  Cardiovascular: RRR, no murmurs heard  Gastrointestinal: ND/S/NT  : no Douglass, no SP/flank discomfort  Musculoskeletal: no joint swelling, ROM intact  Extremities: no edema  Neurological: non-focal  Skin: warm and dry  Psych: calm, stable mood     MEDS:  Scheduled meds  heparin (porcine), 5,000 Units, subcutaneous, q8h  pantoprazole, 40 mg, oral, Daily before breakfast   Or  pantoprazole, 40 mg, intravenous, Daily before breakfast  polyethylene glycol, 17 g, oral, Daily        Continuous meds       PRN meds  PRN medications: HYDROmorphone, ketorolac, ondansetron **OR** ondansetron, oxyCODONE-acetaminophen, polyethylene glycol      ASSESSMENT/PLAN:  Darshana Tapia is a 24 y.o. female, with a PMH of obesity, eczema, who presented to [facility] ED on 1/5/2025 for abdominal pain and nausea. [place] [facility] [place] with c/o abd pain, diagnosed with cholelithiasis and discharged home with referral for outpatient follow up with general surgery. Returns to the ED today with with worsening abdominal pain and nausea. Reports initially felt better when she was discharged home on 1/3 and was just taking OTC NSAIDs for pain. Yesterday she developed severe RUQ pain radiating to her back and " nausea prompting her to come back to the ED. She denies any emesis. Denies any fever or chills. Feels somewhat constipated. ED Course: VSS. Labs notable for ALT 80, . UA negative. US gallbladder shows cholelithiasis with no other sonographic evidence for acute cholecystitis. Fatty infiltration of the liver.   Received Morphine and Zofran.      #Abdominal pain  #Transaminitis  Gallbladder US shows cholelithiasis with no other sonographic evidence for acute cholecystitis. Fatty infiltration of the liver  PRN analgesics/antiemetics  Surgery following - lap cholecystectomy completed today without complication   Fat restricted diet     Other comorbidities as above  - Continue medications as ordered and adjust based on clinical course     VTE / GI prophylaxis   - Subcutaneous heparin, PPI, bowel regimen in place     Discharge planning  - HNN   - Pt returned from OR later in the day, was very sleepy from anesthesia and Mom requesting she stay (should be okay for early morning dc pending surgery recs)    Discussed with Dr. Amor and the interdisciplinary team     Glenis Deutsch PA-C

## 2025-01-06 NOTE — ANESTHESIA PROCEDURE NOTES
Airway  Date/Time: 1/6/2025 2:10 PM  Urgency: elective    Airway not difficult    Staffing  Performed: CAA and ALVA   Authorized by: Ron Bustamante MD    Performed by: FADY Mcleod  Patient location during procedure: OR    Indications and Patient Condition  Indications for airway management: anesthesia and airway protection  Spontaneous Ventilation: absent  Sedation level: deep  Preoxygenated: yes  Mask difficulty assessment: 1 - vent by mask    Final Airway Details  Final airway type: endotracheal airway      Successful airway: ETT  Cuffed: yes   Successful intubation technique: direct laryngoscopy  Facilitating devices/methods: intubating stylet  Endotracheal tube insertion site: oral  Blade: Samuel  Blade size: #3  ETT size (mm): 7.0  Cormack-Lehane Classification: grade I - full view of glottis  Placement verified by: chest auscultation and capnometry   Measured from: lips  ETT to lips (cm): 21  Number of attempts at approach: 1

## 2025-01-06 NOTE — ED TRIAGE NOTES
Pt to ED from home for complaints of abd pain that radiates to back and nausea that has been present since 12-24-24.  Pt states that two days ago she was diagnosed with gallstones, but have not been able to follow up with surgery since it is the weekend.

## 2025-01-06 NOTE — PROGRESS NOTES
Pharmacy Medication History     Source of Information: PATIENT    Additional concerns with the patient's PTA list.     The following updates were made to the Prior to Admission medication list:     Medications ADDED:   ALEVE 220 MG  Medications CHANGED:  N/A  Medications REMOVED:   N/A  Medications NOT TAKING:   DIPROLENE 0.05 % OINTMENT  SLYND 4 MG  ZOFRAN ODT 4 MG    Allergy reviewed : Yes    Meds 2 Beds : No    Outpatient pharmacy confirmed and updated in chart : Yes    Pharm[facility]    The list below reflectives the updated PTA list. Please review each medication in order reconciliation for additional clarification and justification.    Prior to Admission Medications   Prescriptions Last Dose Informant Patient Reported? Taking?                             naproxen (Naprosyn) 250 mg tablet 1/5/2025 Evening      Sig: Take 1 tablet (250 mg) by mouth 2 times daily (morning and late afternoon).                   Facility-Administered Medications: None       The list below reflectives the updated allergy list. Please review each documented allergy for additional clarification and justification.    No Known Allergies       01/06/25 at 8:43 AM - Emeli Cochran

## 2025-01-06 NOTE — ANESTHESIA PREPROCEDURE EVALUATION
"Patient: Darshana Tapia    Procedure Information       Date: 01/06/25    Procedure: CHOLECYSTECTOMY, LAPAROSCOPIC, WITH CHOLANGIOGRAM (Abdomen)    Location: [facility]    Surgeons: Candelario Smith MD            Relevant Problems   Neuro   (+) Bilateral carpal tunnel syndrome      Liver   (+) Calculus of gallbladder without cholecystitis without obstruction      Endocrine   (+) Morbid obesity (Multi)      Musculoskeletal   (+) Bilateral carpal tunnel syndrome      Skin   (+) Eczema   (+) Rash       Clinical information reviewed:   Tobacco  Allergies  Meds   Med Hx  Surg Hx   Fam Hx  Soc Hx         Past Medical History:   Diagnosis Date    Eczema     Obesity 09/15/2023      Past Surgical History:   Procedure Laterality Date    FINGER SURGERY       Social History     Tobacco Use    Smoking status: Never    Smokeless tobacco: Never   Vaping Use    Vaping status: Never Used   Substance Use Topics    Alcohol use: Never    Drug use: Never      Current Outpatient Medications   Medication Instructions    betamethasone dipropionate (Diprolene) 0.05 % ointment 0.5 Applications, 2 times daily    drospirenone, contraceptive, (Slynd) 4 mg (28) tablet 1 tablet, Daily    naproxen (NAPROSYN) 250 mg, 2 times daily (morning and late afternoon)    ondansetron ODT (ZOFRAN-ODT) 4 mg, oral, Every 8 hours PRN      No Known Allergies     Chemistry    Lab Results   Component Value Date/Time     01/06/2025 0533    K 3.8 01/06/2025 0533     (H) 01/06/2025 0533    CO2 26 01/06/2025 0533    BUN 11 01/06/2025 0533    CREATININE 0.66 01/06/2025 0533    Lab Results   Component Value Date/Time    CALCIUM 8.8 01/06/2025 0533    ALKPHOS 70 01/06/2025 0533     (H) 01/06/2025 0533     (H) 01/06/2025 0533    BILITOT 1.0 01/06/2025 0533          No results found for: \"HGBA1C\"  Lab Results   Component Value Date/Time    WBC 3.8 (L) 01/06/2025 0533    HGB 12.3 01/06/2025 0533    HCT 36.1 01/06/2025 0533     " "01/06/2025 0533     No results found for: \"PROTIME\", \"PTT\", \"INR\"  No results found for: \"ABORH\"  Encounter Date: 10/17/24   ECG 12 lead   Result Value    Ventricular Rate 98    Atrial Rate 98    SC Interval 132    QRS Duration 78    QT Interval 370    QTC Calculation(Bazett) 472    P Axis 26    R Axis 26    T Axis 24    QRS Count 16    Q Onset 217    P Onset 151    P Offset 197    T Offset 402    QTC Fredericia 435    Narrative    Normal sinus rhythm with sinus arrhythmia  Normal ECG  No previous ECGs available  See ED provider note for full interpretation and clinical correlation  Confirmed by Gabi Cuadra ([id number]) on 10/19/2024 11:43:36 AM     No results found for this or any previous visit from the past 1095 days.       Visit Vitals  /55 (BP Location: Left arm, Patient Position: Lying)   Pulse 75   Temp 36.8 °C (98.3 °F) (Temporal)   Resp 18   Ht 1.549 m (5' 1\")   Wt 99.8 kg (220 lb)   LMP 12/29/2024   SpO2 97%   BMI 41.57 kg/m²   OB Status Having periods   Smoking Status Never   BSA 2.07 m²     No data recorded    Physical Exam    Airway  Mallampati: III  TM distance: >3 FB  Neck ROM: full     Cardiovascular - normal exam  Rhythm: regular  Rate: normal     Dental    Pulmonary - normal exam     Abdominal - normal exam             Anesthesia Plan    History of general anesthesia?: yes  History of complications of general anesthesia?: no    ASA 2     general   (GETA with standard ASA monitoring)  intravenous induction   Postoperative administration of opioids is intended.  Anesthetic plan and risks discussed with patient.    Plan discussed with CAA and CRNA.        "

## 2025-01-06 NOTE — H&P
HPI: Darshana Tapia is a 24 y.o. female, with a PMH of obesity, eczema, who presented to [facility] ED on 1/5/2025 for abdominal pain and nausea. [place] [facility] [place] with c/o abd pain, diagnosed with cholelithiasis and discharged home with referral for outpatient follow up with general surgery. Returns to the ED today with with worsening abdominal pain and nausea. Reports initially felt better when she was discharged home on 1/3 and was just taking OTC NSAIDs for pain. Yesterday she developed severe RUQ pain radiating to her back and nausea prompting her to come back to the ED. She denies any emesis. Denies any fever or chills. Feels somewhat constipated.     ED Course: VSS. Labs notable for ALT 80, . UA negative. US gallbladder shows cholelithiasis with no other sonographic evidence for acute cholecystitis. Fatty infiltration of the liver.   Received Morphine and Zofran.     Patient History   PMH: She has a past medical history of Bronchitis (09/15/2023), Ear pain (09/15/2023), Obesity (09/15/2023), Personal history of other specified conditions (09/14/2021), Right ankle pain (09/15/2023), and Unstable ankle (09/15/2023).  PSH: She has a past surgical history that includes Other surgical history (03/13/2020).  SH: She reports that she has never smoked. She has never used smokeless tobacco. She reports that she does not drink alcohol and does not use drugs.  FH: family history is not on file.     No Known Allergies  Current Outpatient Medications   Medication Instructions    betamethasone dipropionate (Diprolene) 0.05 % ointment 0.5 Applications, Topical, 2 times daily, apply sparingly to affected area    drospirenone, contraceptive, (Slynd) 4 mg (28) tablet 1 tablet, oral, Daily    ondansetron ODT (ZOFRAN-ODT) 4 mg, oral, Every 8 hours PRN     Review of Systems   ROS: 10-point review of systems was performed and is otherwise negative except as noted in HPI.        Heart Rate:  [72-82]  "  Temperature:  [36 °C (96.8 °F)]   Respirations:  [16]   BP: ()/(63-80)   Height:  [154.9 cm (5' 1\")]   Weight:  [99.8 kg (220 lb)]   Pulse Ox:  [96 %-98 %]      0-10 (Numeric) Pain Score: 2   Vitals:    25 2104   Weight: 99.8 kg (220 lb)        Physical Exam:  Vitals and nursing notes reviewed.  GENERAL: Alert and awake, cooperative; in no acute distress  SKIN: Warm and dry, cap refill <2  HEENT: Normocephalic, PEERL, mucous membranes pink and moist  CARDIAC: Regular rate and rhythm, S1S2, no murmurs or abnormal heart sounds  CHEST: Normal respiratory effort, no abnormal breath sounds  ABDOMEN: soft, non distended, mildly tender on exam to RUQ and epigastric region with no rebound, normoactive bowel sounds   EXTREMITIES: No lower extremity edema, normal pulses all 4 extremities  NEURO: Alert and oriented, mental status at baseline, no focal deficits  PSYCH: Behavior and affect as expected     Medications      Diagnostic Results   CBC- 2025: 10:35 PM  10.8 13.4 230    40.7      BMP- 2025: 12:14 AM  138 13 _ 97   3.8 0.67 25    Estimated Creatinine Clearance: 125 mL/min (by C-G formula based on SCr of 0.67 mg/dL).     CA: 9.1 PROTIEN: 7.2 ALT: 80 Total Bili: 0.7 M.10   PHOS: _ ALBUMIN: 3.8 AST: 169   Alk Phos: 65      COAGS- No results in last year.  _   _ _     CV Labs  No results found for: \"TROPHS\", \"BNP\", \"HGBA1C\", \"LDL\", \"LDLCALC\"    Pertinent Imaging  US gallbladder    Result Date: 2025  Interpreted By:  Jocelyn Guzman, STUDY: US GALLBLADDER;  2025 11:07 pm   INDICATION: Signs/Symptoms:Diagnosed with cholelithiasis several days ago, worsening epigastric and right upper quadrant pain and tenderness.     COMPARISON: Gallbladder ultrasound 2025.   ACCESSION NUMBER(S): [id number]   ORDERING CLINICIAN: CHAVEZ CAIN   TECHNIQUE: Multiple images of the right upper quadrant were obtained.   FINDINGS: LIVER: The liver measures 14.2 cm. There is increased echogenicity of the " liver parenchyma suggestive of fatty infiltration.   GALLBLADDER: There is cholelithiasis. No gallbladder wall thickening or surrounding fluid. The gallbladder wall thickness is 0.2 cm. Sonographic Quintana's sign is negative.   BILE DUCTS: No evidence of intra or extrahepatic biliary dilatation is identified; the common bile duct measures 0.2cm.   PANCREAS: The pancreas is obscured by overlying bowel gas.   RIGHT KIDNEY: The right kidney measures 9.7 cm in length. No hydronephrosis.       1. Cholelithiasis with no other sonographic evidence for acute cholecystitis. 2. Fatty infiltration of the liver.   MACRO: None     Signed by: Jocelyn Guzman 1/6/2025 12:04 AM Dictation workstation:   [id number]        Assessment/Plan   Darshana Tapia is a 24 y.o. female, with a PMH of obesity, eczema, who presented to [facility] ED on 1/5/2025 for abdominal pain and nausea. Was seen at [facility] [place] on 1/3 with c/o abd pain, diagnosed with cholelithiasis and discharged home with referral for outpatient follow up with general surgery. Returns to the ED today with with worsening abdominal pain and nausea. Reports initially felt better when she was discharged home on 1/3 and was just taking OTC NSAIDs for pain. Yesterday she developed severe RUQ pain radiating to her back and nausea prompting her to come back to the ED. She denies any emesis. Denies any fever or chills. Feels somewhat constipated. ED Course: VSS. Labs notable for ALT 80, . UA negative. US gallbladder shows cholelithiasis with no other sonographic evidence for acute cholecystitis. Fatty infiltration of the liver.   Received Morphine and Zofran.     #Abdominal pain  #Mild transaminitis  Gallbladder US shows cholelithiasis with no other sonographic evidence for acute cholecystitis. Fatty infiltration of the liver  Keep NPO  PRN analgesics/antiemetics  Surgery consult     GI/VTE PPX: PPI, SCDs    Chart, medical history, and labs/testing reviewed in detail.      Disposition: Discharge home once medically cleared and stable, pending surgery team evaluation    JERALD Coyle-CNP   Observation/Internal Med LAILA  [facility]  01/06/25  3:30 AM  Total time of 45 minutes spent on professional and overall care, with >50% of time dedicated to counseling/coordination of care.

## 2025-01-06 NOTE — ANESTHESIA POSTPROCEDURE EVALUATION
Patient: Darshana Tapia    Procedure Summary       Date: 01/06/25 Room / Location: [facility] OR 05 / [facility]    Anesthesia Start: 1354 Anesthesia Stop: 1513    Procedure: CHOLECYSTECTOMY, LAPAROSCOPIC, WITH CHOLANGIOGRAM (Abdomen) Diagnosis:       Calculus of gallbladder without cholecystitis without obstruction      (Calculus of gallbladder without cholecystitis without obstruction [K80.20])    Surgeons: Candelario Smith MD Responsible Provider: Ron Bustamante MD    Anesthesia Type: general ASA Status: 2            Anesthesia Type: general    Vitals Value Taken Time   /79 01/06/25 1601   Temp 36.5 °C (97.7 °F) 01/06/25 1600   Pulse 96 01/06/25 1600   Resp 18 01/06/25 1600   SpO2 98 % 01/06/25 1600       Anesthesia Post Evaluation    Patient location during evaluation: PACU  Patient participation: complete - patient participated  Level of consciousness: awake and alert  Pain management: adequate  Airway patency: patent  Cardiovascular status: acceptable and hemodynamically stable  Respiratory status: acceptable, spontaneous ventilation and nonlabored ventilation  Hydration status: acceptable  Postoperative Nausea and Vomiting: none        There were no known notable events for this encounter.

## 2025-01-06 NOTE — PROGRESS NOTES
01/06/25 0650   [facility] Disability Status   Are you deaf or do you have serious difficulty hearing? N   Are you blind or do you have serious difficulty seeing, even when wearing glasses? N   Because of a physical, mental, or emotional condition, do you have serious difficulty concentrating, remembering, or making decisions? (5 years old or older) N   Do you have serious difficulty walking or climbing stairs? N   Do you have serious difficulty dressing or bathing? N   Because of a physical, mental, or emotional condition, do you have serious difficulty doing errands alone such as visiting the doctor? N

## 2025-01-06 NOTE — DISCHARGE INSTR - OTHER ORDERS
Please use HealthCare.gov to obtain affordable healthcare coverage- www.healthcare.gov    For Affordable medical care:    [facility] ([facility]) [facility]   [address] [place], [place] [id number] [phone]    [facility]  [address] [place], [place] [id number] [phone]    [facility]  [address] [place] [address]   [phone]    [facility]  [address] [place] [address]  [facility]   [address] [place] [address]  [facility]   [address] [place] [address]    Monday - Friday   8:00 a.m. to 5:00 p.m.    Call   [phone]

## 2025-01-07 NOTE — DISCHARGE SUMMARY
Discharge Diagnosis  Calculus of gallbladder without cholecystitis without obstruction    Issues Requiring Follow-Up  Please follow up with your PCP and general surgery     Discharge Meds     Medication List      START taking these medications     ondansetron 4 mg tablet; Commonly known as: Zofran; Take 1 tablet (4 mg)   by mouth every 8 hours if needed for nausea for up to 10 doses.   oxyCODONE-acetaminophen 5-325 mg tablet; Commonly known as: Percocet;   Take 1 tablet by mouth every 6 hours if needed for severe pain (7 - 10)   for up to 6 days.     CONTINUE taking these medications     naproxen 250 mg tablet; Commonly known as: Naprosyn       Test Results Pending At Discharge  Pending Labs       Order Current Status    Extra Urine Gray Tube Collected (01/06/25 0015)    Surgical Pathology Exam In process    Urinalysis with Reflex Culture and Microscopic In process            Hospital Course  Darshana Tapia is a 24 y.o. female, with a PMH of obesity, eczema, who presented to [facility] ED on 1/5/2025 for abdominal pain and nausea. Was seen at [facility] ED on 1/3 with c/o abd pain, diagnosed with cholelithiasis and discharged home with referral for outpatient follow up with general surgery. Returns to the ED today with with worsening abdominal pain and nausea. Reports initially felt better when she was discharged home on 1/3 and was just taking OTC NSAIDs for pain. Yesterday she developed severe RUQ pain radiating to her back and nausea prompting her to come back to the ED. She denies any emesis. Denies any fever or chills. Feels somewhat constipated. ED Course: VSS. Labs notable for ALT 80, . UA negative. US gallbladder shows cholelithiasis with no other sonographic evidence for acute cholecystitis. Fatty infiltration of the liver.   Received Morphine and Zofran.     Pt was admitted to observation with plans for continued monitoring and general surgery consult. AM labs showed worsening transaminitis. A decision  was made between the pt and general surgery to pursue lap cholecystectomy which was completed on 1/6/25 without complications. Pt originally tired/sleepy after returning to obs unit, Mom at bedside requesting she stay the night to nursing. However, pt felt much better upon waking and was walking the unit without an issue, ultimately requesting to leave. Pt was cleared for dc by surgery.     DC plan: Follow up with PCP and general surgery. PRN pain meds and antiemetics prescribed. Pt in agreement with dc plan. Vitals stable for dc.     Discharged in stable and satisfactory condition.     Discussed with Dr. Amor and the interdisciplinary team      Pertinent Physical Exam At Time of Discharge  Physical Exam  Constitutional:       General: She is not in acute distress.     Appearance: Normal appearance. She is not ill-appearing or toxic-appearing.   HENT:      Head: Normocephalic and atraumatic.   Cardiovascular:      Rate and Rhythm: Normal rate and regular rhythm.      Pulses: Normal pulses.      Heart sounds: Normal heart sounds. No murmur heard.     No friction rub. No gallop.   Pulmonary:      Effort: Pulmonary effort is normal.      Breath sounds: Normal breath sounds. No wheezing, rhonchi or rales.   Abdominal:      General: There is no distension.      Palpations: Abdomen is soft. There is no mass.      Tenderness: There is no abdominal tenderness.   Musculoskeletal:      Right lower leg: No edema.      Left lower leg: No edema.   Skin:     General: Skin is warm and dry.   Neurological:      General: No focal deficit present.      Mental Status: She is alert and oriented to person, place, and time.   Psychiatric:         Mood and Affect: Mood normal.         Behavior: Behavior normal.         Outpatient Follow-Up  Future Appointments   Date Time Provider Department Center   1/22/2025  9:15 AM Candelario Smith MD [id number] Dixon Deutsch PA-C

## 2025-01-07 NOTE — CARE PLAN
The clinical goals for the shift include be dc tonight    Problem: Pain  Goal: Takes deep breaths with improved pain control throughout the shift  Outcome: Progressing  Goal: Turns in bed with improved pain control throughout the shift  Outcome: Progressing  Goal: Walks with improved pain control throughout the shift  Outcome: Progressing  Goal: Performs ADL's with improved pain control throughout shift  Outcome: Progressing  Goal: Free from opioid side effects throughout the shift  Outcome: Progressing  Goal: Free from acute confusion related to pain meds throughout the shift  Outcome: Progressing

## 2025-01-08 ENCOUNTER — TELEPHONE (OUTPATIENT)
Dept: SURGERY | Facility: CLINIC | Age: 25
End: 2025-01-08

## 2025-01-08 DIAGNOSIS — K81.0 ACUTE CHOLECYSTITIS: Primary | ICD-10-CM

## 2025-01-08 RX ORDER — OXYCODONE AND ACETAMINOPHEN 5; 325 MG/1; MG/1
1 TABLET ORAL EVERY 6 HOURS PRN
Qty: 12 TABLET | Refills: 0 | Status: SHIPPED | OUTPATIENT
Start: 2025-01-08 | End: 2025-01-20

## 2025-01-08 NOTE — TELEPHONE ENCOUNTER
Patients mother called requesting a refill on pain medication. She states that patient only received 6 tablets (taking sparingly). I inquired what is the patients pain level.  She states that she is not with daughter at current time. She gave me the number to call patient. I left message for patient shoshana call the office.   Patient returned call and states that pain is 7 on scale 0-10. She states that she is rotating the Naproxen and Oxycodone. Refill request on Oxycodone. She inquired about stool softener (no prescription) . She is using Carmen Lax .

## 2025-01-08 NOTE — TELEPHONE ENCOUNTER
Patients mother called back to see if patient can take Ibuprofen 800 mg?  I inquired about Ibuprofen on the  first phone call and she states that patient was told not to take. I informed mother that refill has been sent to pharmacy for Oxycodone.

## 2025-01-10 LAB
LABORATORY COMMENT REPORT: NORMAL
PATH REPORT.FINAL DX SPEC: NORMAL
PATH REPORT.GROSS SPEC: NORMAL
PATH REPORT.RELEVANT HX SPEC: NORMAL
PATH REPORT.TOTAL CANCER: NORMAL

## 2025-01-22 ENCOUNTER — OFFICE VISIT (OUTPATIENT)
Dept: SURGERY | Facility: CLINIC | Age: 25
End: 2025-01-22

## 2025-01-22 VITALS
DIASTOLIC BLOOD PRESSURE: 67 MMHG | WEIGHT: 217 LBS | SYSTOLIC BLOOD PRESSURE: 102 MMHG | RESPIRATION RATE: 16 BRPM | HEIGHT: 61 IN | HEART RATE: 73 BPM | BODY MASS INDEX: 40.97 KG/M2

## 2025-01-22 DIAGNOSIS — K80.20 CALCULUS OF GALLBLADDER WITHOUT CHOLECYSTITIS WITHOUT OBSTRUCTION: ICD-10-CM

## 2025-01-22 DIAGNOSIS — Z09 SURGERY FOLLOW-UP: Primary | ICD-10-CM

## 2025-01-22 DIAGNOSIS — K81.9 CHOLECYSTITIS: ICD-10-CM

## 2025-01-22 PROCEDURE — 1036F TOBACCO NON-USER: CPT | Performed by: SURGERY

## 2025-01-22 PROCEDURE — 99211 OFF/OP EST MAY X REQ PHY/QHP: CPT | Performed by: SURGERY

## 2025-01-22 ASSESSMENT — PAIN SCALES - GENERAL: PAINLEVEL_OUTOF10: 0-NO PAIN

## 2025-01-22 NOTE — LETTER
January 22, 2025     Johnny Salmon DO  [address]  [address]  [facility] [address]    Patient: Darshana Tapia   YOB: 2000   Date of Visit: 1/22/2025       Dear Dr. Johnny Salmon DO:    Thank you for referring Darshana Tapia to me for evaluation. Below are my notes for this consultation.  If you have questions, please do not hesitate to call me. I look forward to following your patient along with you.       Sincerely,     Candelario Smith MD      CC: No Recipients  ______________________________________________________________________________________    Assessment/Plan  Excellent recovery following recent laparoscopic cholecystectomy  -We reviewed intraoperative findings and final pathology report  -Patient encouraged to resume regular activities including exercise as tolerated  -Avoid greasy and fatty foods first couple months after surgery  -Follow-up with me as needed    Subjective  Following up after recent hospitalization for cholecystitis.  She had a laparoscopic cholecystectomy and cholangiogram.  Doing well.  No pain.  Back to work.  Able to eat without any problems       Objective    Physical Exam  NAD  A&Ox3  Non icteric  CTA  RR  Abdomen soft min tender. Wounds clean, intact  Extremities warm, well perfused         Relevant Results      No results found for this or any previous visit (from the past 24 hours).    to patient: Yes (seen)       Dx: Calculus of gallbladder without elias...    0 Result Notes       Component  Resulting Agency   FINAL DIAGNOSIS   A. Gallbladder, cholecystectomy:  - Chronic cholecystitis with cholelithiasis and cholesterolosis.     Electronically signed by Lily Rashid MD on 1/10/2025 at 1306      [facility]   By the signature on this report, the individual or group listed as making the Final Interpretation/Diagnosis certifies that they have reviewed this case.    Clinical History  [facility]          I spent 25 minutes in the professional and overall  care of this patient.      Candelario Smith MD       Total lift/Pt contracted

## 2025-01-22 NOTE — PROGRESS NOTES
Assessment/Plan   Excellent recovery following recent laparoscopic cholecystectomy  -We reviewed intraoperative findings and final pathology report  -Patient encouraged to resume regular activities including exercise as tolerated  -Avoid greasy and fatty foods first couple months after surgery  -Follow-up with me as needed    Subjective   Following up after recent hospitalization for cholecystitis.  She had a laparoscopic cholecystectomy and cholangiogram.  Doing well.  No pain.  Back to work.  Able to eat without any problems       Objective     Physical Exam  NAD  A&Ox3  Non icteric  CTA  RR  Abdomen soft min tender. Wounds clean, intact  Extremities warm, well perfused         Relevant Results      No results found for this or any previous visit (from the past 24 hours).    to patient: Yes (seen)       Dx: Calculus of gallbladder without elias...    0 Result Notes       Component  Resulting Agency   FINAL DIAGNOSIS   A. Gallbladder, cholecystectomy:  - Chronic cholecystitis with cholelithiasis and cholesterolosis.     Electronically signed by Lily Rashid MD on 1/10/2025 at 1306      [facility]   By the signature on this report, the individual or group listed as making the Final Interpretation/Diagnosis certifies that they have reviewed this case.    Clinical History  [facility]          I spent 25 minutes in the professional and overall care of this patient.      Candelario Smith MD

## 2025-02-14 ENCOUNTER — APPOINTMENT (OUTPATIENT)
Dept: PRIMARY CARE | Facility: CLINIC | Age: 25
End: 2025-02-14

## 2025-06-06 ENCOUNTER — HOSPITAL ENCOUNTER (EMERGENCY)
Facility: HOSPITAL | Age: 25
Discharge: HOME | End: 2025-06-06

## 2025-06-06 VITALS
DIASTOLIC BLOOD PRESSURE: 67 MMHG | SYSTOLIC BLOOD PRESSURE: 119 MMHG | RESPIRATION RATE: 18 BRPM | HEART RATE: 94 BPM | WEIGHT: 230 LBS | HEIGHT: 61 IN | OXYGEN SATURATION: 99 % | BODY MASS INDEX: 43.43 KG/M2 | TEMPERATURE: 98.6 F

## 2025-06-06 DIAGNOSIS — L03.119 CELLULITIS OF LOWER EXTREMITY, UNSPECIFIED LATERALITY: Primary | ICD-10-CM

## 2025-06-06 DIAGNOSIS — S90.426A BLISTER OF FIFTH TOE: ICD-10-CM

## 2025-06-06 PROCEDURE — 2500000001 HC RX 250 WO HCPCS SELF ADMINISTERED DRUGS (ALT 637 FOR MEDICARE OP): Performed by: PHYSICIAN ASSISTANT

## 2025-06-06 PROCEDURE — 99283 EMERGENCY DEPT VISIT LOW MDM: CPT

## 2025-06-06 PROCEDURE — 2500000002 HC RX 250 W HCPCS SELF ADMINISTERED DRUGS (ALT 637 FOR MEDICARE OP, ALT 636 FOR OP/ED): Performed by: PHYSICIAN ASSISTANT

## 2025-06-06 RX ORDER — SULFAMETHOXAZOLE AND TRIMETHOPRIM 800; 160 MG/1; MG/1
1 TABLET ORAL EVERY 12 HOURS
Qty: 10 TABLET | Refills: 0 | Status: SHIPPED | OUTPATIENT
Start: 2025-06-06 | End: 2025-06-11

## 2025-06-06 RX ORDER — SULFAMETHOXAZOLE AND TRIMETHOPRIM 800; 160 MG/1; MG/1
1 TABLET ORAL ONCE
Status: COMPLETED | OUTPATIENT
Start: 2025-06-06 | End: 2025-06-06

## 2025-06-06 RX ORDER — AMOXICILLIN AND CLAVULANATE POTASSIUM 875; 125 MG/1; MG/1
1 TABLET, FILM COATED ORAL ONCE
Status: COMPLETED | OUTPATIENT
Start: 2025-06-06 | End: 2025-06-06

## 2025-06-06 RX ORDER — IBUPROFEN 600 MG/1
600 TABLET, FILM COATED ORAL EVERY 6 HOURS PRN
Qty: 28 TABLET | Refills: 0 | Status: SHIPPED | OUTPATIENT
Start: 2025-06-06 | End: 2025-06-13

## 2025-06-06 RX ORDER — AMOXICILLIN AND CLAVULANATE POTASSIUM 875; 125 MG/1; MG/1
1 TABLET, FILM COATED ORAL EVERY 12 HOURS
Qty: 14 TABLET | Refills: 0 | Status: SHIPPED | OUTPATIENT
Start: 2025-06-06 | End: 2025-06-13

## 2025-06-06 RX ADMIN — AMOXICILLIN AND CLAVULANATE POTASSIUM 1 TABLET: 875; 125 TABLET, FILM COATED ORAL at 08:34

## 2025-06-06 RX ADMIN — SULFAMETHOXAZOLE AND TRIMETHOPRIM 1 TABLET: 800; 160 TABLET ORAL at 08:34

## 2025-06-06 ASSESSMENT — COLUMBIA-SUICIDE SEVERITY RATING SCALE - C-SSRS
6. HAVE YOU EVER DONE ANYTHING, STARTED TO DO ANYTHING, OR PREPARED TO DO ANYTHING TO END YOUR LIFE?: NO
2. HAVE YOU ACTUALLY HAD ANY THOUGHTS OF KILLING YOURSELF?: NO
1. IN THE PAST MONTH, HAVE YOU WISHED YOU WERE DEAD OR WISHED YOU COULD GO TO SLEEP AND NOT WAKE UP?: NO

## 2025-06-06 NOTE — ED TRIAGE NOTES
Patient has blisters on pinky toes that has red line going up towards heel, patient has had the blisters since Tuesday. Patient denies chest pain, shortness of breath. Denies any signficant medical conditions.   Valtrex Pregnancy And Lactation Text: this medication is Pregnancy Category B and is considered safe during pregnancy. This medication is not directly found in breast milk but it's metabolite acyclovir is present.

## 2025-06-06 NOTE — Clinical Note
Darshana Tapia was seen and treated in our emergency department on 6/6/2025.  She may return to work on 06/10/2025.       If you have any questions or concerns, please don't hesitate to call.      Clement Blunt PA-C

## 2025-06-06 NOTE — Clinical Note
Darshana Tapia was seen and treated in our emergency department on 6/6/2025.  She may return to school on 06/10/2025.      If you have any questions or concerns, please don't hesitate to call.      Clement Blunt PA-C

## 2025-06-06 NOTE — ED PROVIDER NOTES
HPI   Chief Complaint   Patient presents with    Foot Injury       Very pleasant 25-year-old female complains of blisters on both pinky toes that have now developed a red line that started on Tuesday after she was at a concert wearing crocs.  She states she has tried soaking the feet without improvement.  She is not diabetic or immunocompromise.  Admits to pain especially when standing and then she is concerned about returning to work.              Patient History   Medical History[1]  Surgical History[2]  Family History[3]  Social History[4]    Physical Exam   ED Triage Vitals [06/06/25 0749]   Temperature Heart Rate Respirations BP   37 °C (98.6 °F) 94 18 119/67      Pulse Ox Temp src Heart Rate Source Patient Position   99 % -- -- --      BP Location FiO2 (%)     -- --       Physical Exam  Vitals and nursing note reviewed.   Constitutional:       General: She is not in acute distress.     Appearance: Normal appearance. She is normal weight. She is not ill-appearing, toxic-appearing or diaphoretic.   HENT:      Head: Normocephalic and atraumatic.      Right Ear: External ear normal.      Left Ear: External ear normal.      Nose: Nose normal.   Eyes:      Extraocular Movements: Extraocular movements intact.      Conjunctiva/sclera: Conjunctivae normal.      Pupils: Pupils are equal, round, and reactive to light.   Pulmonary:      Effort: Pulmonary effort is normal. No respiratory distress.      Breath sounds: No stridor.   Abdominal:      General: There is no distension.   Musculoskeletal:         General: No swelling or deformity.        Feet:    Skin:     Capillary Refill: Capillary refill takes less than 2 seconds.      Findings: No rash.   Neurological:      General: No focal deficit present.      Mental Status: She is alert and oriented to person, place, and time. Mental status is at baseline.   Psychiatric:         Mood and Affect: Mood normal.         Behavior: Behavior normal.         Thought Content:  Thought content normal.         Judgment: Judgment normal.           ED Course & MDM   Diagnoses as of 06/06/25 1846   Cellulitis of lower extremity, unspecified laterality   Blister of fifth toe                 No data recorded     [place] Coma Scale Score: 15 (06/06/25 0749 : Koby Cintron, MICHELE)                           Medical Decision Making  Differential diagnosis cellulitis versus burn versus blister    Consider labs but patient has not been on antibiotics yet will plan with warm compress close following of the early streaking and return precaution she understands if not better over the next 24 to 72 hours to return.    Escalation of care considered: I considered admission, however given the improvement in symptoms and reassuring workup, patient is appropriate for discharge and outpatient care. Risk of hospitalization outweighs the benefits. Pt is resting comfortably, well hydrated, tolerating PO, normal neuro exam, lungs CTA, ab soft NTND, not requiring supplemental O2/supportive care/IV medications or IVF, etc.   Ambulating [well/at baseline].  Do not suspect life threatening condition at this time.    Shared Decision made with pt who agrees with plan          Procedure  Procedures       [1]   Past Medical History:  Diagnosis Date    Eczema     Obesity 09/15/2023   [2]   Past Surgical History:  Procedure Laterality Date    FINGER SURGERY     [3] No family history on file.  [4]   Social History  Tobacco Use    Smoking status: Never    Smokeless tobacco: Never   Vaping Use    Vaping status: Never Used   Substance Use Topics    Alcohol use: Never    Drug use: Never        Clement Blunt PA-C  06/06/25 1518

## (undated) DEVICE — Device

## (undated) DEVICE — SHEAR, W/UNIPOLAR CAUTERY, ENDOSHEAR, 5 MM

## (undated) DEVICE — APPLIER, 5MM ENDOSCOPIC, HEM-O-LOCK, W/15 ML CLIPS

## (undated) DEVICE — RETRIEVAL SYSTEM, MONARCH, 10MM DISP ENDOSCOPIC

## (undated) DEVICE — SYSTEM, TROCAR LAP, 5X100MM, SHIELD BLADED, KII ADVANCED FIX ABDOMINAL

## (undated) DEVICE — CARE KIT, LAPAROSCOPIC, ADVANCED

## (undated) DEVICE — PUMP, STRYKERFLOW 2 & HANDPIECE W/10FT. IRRIGATION TUBING

## (undated) DEVICE — HOLSTER, JET SAFETY

## (undated) DEVICE — SUTURE, MONOCRYL, 4-0, 18 IN, PS2, UNDYED

## (undated) DEVICE — TROCAR SYSTEM, BALLOON, KII GELPORT, 12 X 100MM

## (undated) DEVICE — CLIP, ENDO, CLINCH II, W/RATCHET, ON/OFF, CLINCH II, 5 MM

## (undated) DEVICE — ADHESIVE, SKIN, DERMABOND ADVANCED, 15CM, PEN-STYLE

## (undated) DEVICE — CORD, MONOPOLAR, HIGH FREQUENCY, W/8MM PLUG F/VALLEYLAB, 8FT/244CM, STRL

## (undated) DEVICE — SUTURE, VICRYL, 0, 27 IN, UR-6, VIOLET

## (undated) DEVICE — TOWEL, SURGICAL, NEURO, O/R, 16 X 26, BLUE, STERILE

## (undated) DEVICE — CATHETER, CHOLANGIOGRAM, 18 G X 11

## (undated) DEVICE — TUBE SET, PNEUMOLAR HEATED, SMOKE EVACU, HIGH-FLOW

## (undated) DEVICE — GLOVE, SURGICAL, PROTEXIS PI ORTHO, 7.0, PF, LF

## (undated) DEVICE — SOLUTION, INJECTION, USP, SODIUM CHLORIDE 0.9%, .9, NACL, 1000 ML, BAG

## (undated) DEVICE — SCOPE WARMER, LAPAROSCOPE, BAG ONLY, LF

## (undated) DEVICE — CANNULA, KII ADVANCED FIXATION, 5X100MM W/SEAL

## (undated) DEVICE — CATHETER, IV, ANGIOCATH, 14 G X 5.25 IN, FEP POLYMER